# Patient Record
Sex: FEMALE | Race: WHITE | NOT HISPANIC OR LATINO | Employment: FULL TIME | URBAN - METROPOLITAN AREA
[De-identification: names, ages, dates, MRNs, and addresses within clinical notes are randomized per-mention and may not be internally consistent; named-entity substitution may affect disease eponyms.]

---

## 2023-02-21 LAB
ALANINE AMINOTRANSFERASE (SGPT) (U/L) IN SER/PLAS: 25 U/L (ref 7–45)
ALBUMIN (G/DL) IN SER/PLAS: 4.7 G/DL (ref 3.4–5)
ALKALINE PHOSPHATASE (U/L) IN SER/PLAS: 50 U/L (ref 33–136)
ANION GAP IN SER/PLAS: 13 MMOL/L (ref 10–20)
ASPARTATE AMINOTRANSFERASE (SGOT) (U/L) IN SER/PLAS: 23 U/L (ref 9–39)
BILIRUBIN TOTAL (MG/DL) IN SER/PLAS: 0.6 MG/DL (ref 0–1.2)
CALCIUM (MG/DL) IN SER/PLAS: 9.8 MG/DL (ref 8.6–10.6)
CARBON DIOXIDE, TOTAL (MMOL/L) IN SER/PLAS: 24 MMOL/L (ref 21–32)
CHLORIDE (MMOL/L) IN SER/PLAS: 110 MMOL/L (ref 98–107)
CREATININE (MG/DL) IN SER/PLAS: 0.89 MG/DL (ref 0.5–1.05)
GFR FEMALE: 73 ML/MIN/1.73M2
GLUCOSE (MG/DL) IN SER/PLAS: 104 MG/DL (ref 74–99)
POTASSIUM (MMOL/L) IN SER/PLAS: 4.1 MMOL/L (ref 3.5–5.3)
PROTEIN TOTAL: 6.6 G/DL (ref 6.4–8.2)
SODIUM (MMOL/L) IN SER/PLAS: 143 MMOL/L (ref 136–145)
UREA NITROGEN (MG/DL) IN SER/PLAS: 21 MG/DL (ref 6–23)

## 2023-06-06 ENCOUNTER — APPOINTMENT (OUTPATIENT)
Dept: PRIMARY CARE | Facility: CLINIC | Age: 63
End: 2023-06-06
Payer: COMMERCIAL

## 2023-09-06 LAB
ALANINE AMINOTRANSFERASE (SGPT) (U/L) IN SER/PLAS: 23 U/L (ref 7–45)
ALBUMIN (G/DL) IN SER/PLAS: 4.2 G/DL (ref 3.4–5)
ALKALINE PHOSPHATASE (U/L) IN SER/PLAS: 56 U/L (ref 33–136)
ANION GAP IN SER/PLAS: 15 MMOL/L (ref 10–20)
ASPARTATE AMINOTRANSFERASE (SGOT) (U/L) IN SER/PLAS: 19 U/L (ref 9–39)
BILIRUBIN TOTAL (MG/DL) IN SER/PLAS: 0.3 MG/DL (ref 0–1.2)
CALCIDIOL (25 OH VITAMIN D3) (NG/ML) IN SER/PLAS: 82 NG/ML
CALCIUM (MG/DL) IN SER/PLAS: 9.4 MG/DL (ref 8.6–10.6)
CARBON DIOXIDE, TOTAL (MMOL/L) IN SER/PLAS: 22 MMOL/L (ref 21–32)
CHLORIDE (MMOL/L) IN SER/PLAS: 113 MMOL/L (ref 98–107)
CREATININE (MG/DL) IN SER/PLAS: 0.93 MG/DL (ref 0.5–1.05)
GFR FEMALE: 69 ML/MIN/1.73M2
GLUCOSE (MG/DL) IN SER/PLAS: 97 MG/DL (ref 74–99)
PARATHYRIN INTACT (PG/ML) IN SER/PLAS: 50.2 PG/ML (ref 18.5–88)
POTASSIUM (MMOL/L) IN SER/PLAS: 4.3 MMOL/L (ref 3.5–5.3)
PROTEIN TOTAL: 6.3 G/DL (ref 6.4–8.2)
SODIUM (MMOL/L) IN SER/PLAS: 146 MMOL/L (ref 136–145)
UREA NITROGEN (MG/DL) IN SER/PLAS: 17 MG/DL (ref 6–23)

## 2023-09-09 LAB — BONE SPECIFIC ALKALINE PHOSPHATASE: 8.7 UG/L

## 2023-09-12 LAB — N-TELOPEPTIDE,SERUM: 7.5 NM BCE

## 2023-12-01 ENCOUNTER — HOSPITAL ENCOUNTER (OUTPATIENT)
Dept: RADIOLOGY | Facility: HOSPITAL | Age: 63
Discharge: HOME | End: 2023-12-01

## 2023-12-01 DIAGNOSIS — C50.912 MALIGNANT NEOPLASM OF UNSPECIFIED SITE OF LEFT FEMALE BREAST (MULTI): ICD-10-CM

## 2023-12-01 DIAGNOSIS — Z08 ENCOUNTER FOR FOLLOW-UP EXAMINATION AFTER COMPLETED TREATMENT FOR MALIGNANT NEOPLASM: ICD-10-CM

## 2023-12-01 PROCEDURE — A9575 INJ GADOTERATE MEGLUMI 0.1ML: HCPCS

## 2023-12-01 PROCEDURE — 6100000003 BI MR BREAST BILATERAL WITH CONTRAST FAST SCREENING SELF PAY: Mod: 50

## 2023-12-01 PROCEDURE — 2550000001 HC RX 255 CONTRASTS

## 2023-12-01 RX ORDER — GADOTERATE MEGLUMINE 376.9 MG/ML
15 INJECTION INTRAVENOUS
Status: COMPLETED | OUTPATIENT
Start: 2023-12-01 | End: 2023-12-01

## 2023-12-01 RX ADMIN — GADOTERATE MEGLUMINE 15 ML: 376.9 INJECTION INTRAVENOUS at 07:24

## 2023-12-04 ENCOUNTER — ANCILLARY PROCEDURE (OUTPATIENT)
Dept: RADIOLOGY | Facility: CLINIC | Age: 63
End: 2023-12-04
Payer: COMMERCIAL

## 2023-12-04 DIAGNOSIS — M81.0 AGE-RELATED OSTEOPOROSIS WITHOUT CURRENT PATHOLOGICAL FRACTURE: ICD-10-CM

## 2023-12-19 ENCOUNTER — ANCILLARY PROCEDURE (OUTPATIENT)
Dept: RADIOLOGY | Facility: CLINIC | Age: 63
End: 2023-12-19
Payer: COMMERCIAL

## 2023-12-19 PROCEDURE — 77080 DXA BONE DENSITY AXIAL: CPT | Performed by: RADIOLOGY

## 2023-12-19 PROCEDURE — 77080 DXA BONE DENSITY AXIAL: CPT

## 2024-02-27 PROBLEM — Z17.0 MALIGNANT NEOPLASM OF LEFT BREAST IN FEMALE, ESTROGEN RECEPTOR POSITIVE (MULTI): Status: ACTIVE | Noted: 2024-02-27

## 2024-02-27 PROBLEM — C50.912 MALIGNANT NEOPLASM OF LEFT BREAST IN FEMALE, ESTROGEN RECEPTOR POSITIVE (MULTI): Status: ACTIVE | Noted: 2024-02-27

## 2024-02-27 PROBLEM — Z85.3 ENCOUNTER FOR FOLLOW-UP SURVEILLANCE OF BREAST CANCER: Status: ACTIVE | Noted: 2024-02-27

## 2024-02-27 PROBLEM — Z08 ENCOUNTER FOR FOLLOW-UP SURVEILLANCE OF BREAST CANCER: Status: ACTIVE | Noted: 2024-02-27

## 2024-02-27 NOTE — PROGRESS NOTES
Patient ID: Lashay Isaac is a 63 y.o. female.  BREAST CANCER DIAGNOSIS:   Breast         AJCC Edition: 7th (AJCC), Diagnosis Date: 12-Apr-2017, IIA, T2 N0 M0         Treatment History:    1.  03/30/2017: Mammogram: 1. Suspicious spiculated mass in the superior left breast localizing  medially. 2. Suspicious left breast calcifications in the central superior  left breast at posterior depth.3. Left breast masslike asymmetry in the slightly medial superior  quadrant. 4. Right breast asymmetry.     2. 4/06/2017: Bilateral diagnostic mammogram: No mammographic evidence of  malignancy right breast. Suspicious left breast mass and suspicious adjacent left breast  calcifications. Unremarkable left axillary and left internal mammary lymph  nodes.      3. 04/11/2017: Dr. Mondragon in clinic Bilateral ultrasound was done. There  were no abnormal findings on the right. However, in the left breast, at 11:00, 11 cm  from the nipple, there was a 1.2 x 1.6 x 0.9 cm irregular spiculated densely  shadowing hypoechoic mass. Scanning of the left axilla demonstrated four unremarkable lymph nodes. Ultrasound guided core biopsy was done  on April 11, 2017 and this revealed invasive lobular carcinoma, grade 1-2 with associated lobular carcinoma  in situ. Estrogen receptors were positive at 90% and progesterone receptors  positive at 5-10%. HER-2/leonardo was negative.     4. 04/18/2017: Dr. Mondragon in clinic. The  tumor board recommended a trial of an aromatase inhibitor preoperatively to see  if we can shrink this tumor. Because of the location and size of the tumor, it would be helpful if it were smaller.  Plan on seeing the patient with additional breast imaging 2 months into  the trial of neoadjuvant hormonal  therapy.      5. 04/16/2017 Tumor Board: Oncotype testing.  Med Onc referral.   Rad Onc referral.  Consider preop Hormone therapy, Aromatase Inhibitor. Consider Partial Mastectomy or Mastectomy with SLNB.  Consider  combination  chemotherapy.    6.  04/25/2017: Patient underwent Stereotactic vacuum assisted core biopsy of left breast calcifications biopsy showed LCIS/atypical lobular  hyperplasia, in part involving a fibroadenoma with associated calcifications.      7. 05/10/2017 -  Oncotype DX RS on invasive core biopsy was 18 (low risk). LNs likely negative. [id number], Stage IIA.  Anastrozole as neoadjuvant  hormonal therapy initiated 5/11/17.     8. Definitive surgery with left skin and nipple sparing mastectomy, SLN 8/17/17 invasive lobular carcinoma with 0/4 SLN tumor measured 2.3 cm;  [id number]  Stage IIA G1.      9. Anastrazole changed to exemestane 25 mg daily 9/7/17. Twice yearly zolendronic acid  added for osteoporosis and prevention of recurrence 9/28/2017 given Q6 months. Completed zometa 4/2020.   10. Final reconstructive surgery left breast silcone implant reconstruction  and matching right reduction/mastopexy with Dr. Ware in 2/7/2018  11. 3/2023 Completion exemestane 5.5 years therapy, observational therapy moving forward      Past Medical History: Lashay has a past medical history of Encounter for gynecological examination (general) (routine) without abnormal findings (03/20/2017), Encounter for preprocedural laboratory examination (07/07/2017), Encounter for screening mammogram for malignant neoplasm of breast (03/20/2017), Other abnormal and inconclusive findings on diagnostic imaging of breast (07/24/2017), Other abnormal and inconclusive findings on diagnostic imaging of breast, Other abnormal and inconclusive findings on diagnostic imaging of breast (04/11/2017), and Personal history of other specified conditions (04/11/2017).  Surgical History:  Lashay has a past surgical history that includes Other surgical history (03/05/2018); Other surgical history (03/05/2018); Other surgical history (03/05/2018); Other surgical history (08/27/2017); and Tonsillectomy (04/11/2017).  Social Substance History:  ·  Smoking Status  former smoker   ·  Tobacco Use denies   ·  Alcohol Use denies   ·  Drug Use denies   ·  Additional History     Social Hx: Works FT as a [employer]. Happily  lives with  Mp with genetic heart issues.   Smoked for about 30-35 years total 1-2 PPD. Stopped 2017.    OBGYN history: Onset on menses: 12 Onset of menopause: at 46 years of age.  first child born when she was 29  She did not breast feed Used oral contraceptives for perhaps 18 years but never took fertility drugs or hormone replacement therapy.     Family History & Family Oncology History:  No family history of breast or ovarian cancer -Mother with colon cancer in her 70s -Maternal Grandfather with liver cancer  -Maternal Grandmother with melanoma     HISTORY OF PRESENT ILLNESS:  Lashay Isaac is a 63 y.o. female  is here today for breast  cancer treatment follow-up and surveillance. She completed exemestane in 2023 and has been observational therapy. She denies any breast cancer concerns today. She continues with stress from her aging mother's decline- she is in a facility- and has been enrolled on hospice.      She denies any chest pain or breathing issues, no cough, no sob but will have some exertional sob that resolves quickly with rest.      She denies any vision changes or headache issues, dizziness or loss of balance, no falls.      She denies any new or unexplained bone aches or pains. Continues baseline right hip and bilateral shoulder issues. She continues to intermittently will use ibuprofen- a few times per month.  Continues with rheumatology and is now on Prolia.      She denies any skin lesions or masses, oral sores lesions or infections with exception of left axilla intermittent tenderness that has been ongoing since her breast surgery. She does not feel any masses.      She reports a normal appetite and normal bowel movements, urination normal. She denies any issues with sexual functioning.      She  denies any issues with sleep or fatigue     She take 2,000 International Unit(s) daily vitamin D, additional she takes 600 calcium with vitamin D.        Review of Systems - Oncology  ROS 14 points performed, See HPI for exceptions    OBJECTIVE:    VS / Pain:  /86 (BP Location: Right arm, Patient Position: Sitting, BP Cuff Size: Adult)   Pulse 72   Temp 36.6 °C (97.9 °F) (Temporal)   Wt 78.8 kg (173 lb 11.6 oz)   SpO2 94%   BMI 30.77 kg/m²   BSA: 1.87 meters squared   Pain Scale: 0  ECO- Fully active, able to carry on all pre-disease performance w/o restriction.      Performance Status:       Physical Exam    Constitutional: Well developed, awake/alert/oriented x4, no distress, alert and cooperative  EYES: Sclera clear  ENMT: mucous membranes moist, no apparent injury, no lesions seen  Head/Neck: Neck supple, no apparent injury, thyroid without mass or tenderness, No JVD, trachea midline, no bruits  Respiratory / Thoracic: Patent airways, clear to all lobes, normal breath sounds with good chest expansion, thorax symmetric.  Cardiovascular: Regular, rate and rhythm, no murmurs, 2+ equal pulses of the extremities, normal auscultated S 1and S 2  GI: Nondistended, soft, non-tender, no rebound tenderness or guarding, no masses palpable, no organomegaly, +BS, no bruits  Musculoskeletal: ROM intact, no joint swelling, normal strength, no spinal tenderness  Extremities: normal extremities, no cyanosis edema, contusions or wounds, no clubbing  Neurological: alert and oriented x4, intact senses, motor, response and reflexes, normal strength  Breast: Left mastectomy and underlying silicone implant without abnormalities. Right breast mastopexy/reduction. No palpable masses or lesions in either breast. Baseline tenderness with deep palpation left axilla incision site.   Lymphatic: No cervical, supraclavicular, infraclavicular or axillary lymphadenopathy  Psychological: Appropriate and talkative mood and  behavior  Skin: Warm and dry, no lesions, no rashes, no jaundice      Diagnostic Results   XR DEXA bone density  Narrative: Interpreted By:  [place],   STUDY:  DEXA BONE [id number]/19/2023 11:56 am      INDICATION:  Signs/Symptoms: for follow up of osteoporosis  M81.0: Osteoporosis.  The patient is a 64 y/o  year old F.      COMPARISON:  Most recent prior is from 04/22/2021.      ACCESSION NUMBER(S):  [id number]      ORDERING CLINICIAN:  STELLA MURPHY      TECHNIQUE:  DEXA BONE DENSITY      FINDINGS:  SPINE L1-L4  Bone Mineral Density: 0.984  T-Score -1.6  Z-Score -0.2  Bone Mineral Density change vs baseline:  9.8%  Bone Mineral Density change vs previous: 12.1%      LEFT FEMUR -TOTAL  Bone Mineral Density: 0.766  T-Score -1.9   Z-Score  -0.8  Bone Mineral Density change vs baseline: 1.7%  Bone Mineral Density change vs previous: -1.2%      LEFT FEMUR -NECK  Bone Mineral Density: 0.653  T-Score -2.8  Z-Score -1.4          World Health Organization (WHO) criteria for post-menopausal,   Women:  Normal:         T-score at or above -1 SD  Osteopenia:   T-score between -1 and -2.5 SD  Osteoporosis: T-score at or below -2.5 SD          10-year Fracture Risk:  Major Osteoporotic Fracture  24.2  Hip Fracture                        3.3      Note:  If no FRAX score is reported, it is because:  Some T-score for Spine Total or Hip Total or Femoral Neck at or below  -2.5      This exam was performed at [facility] on a GE Lunar Prodigy Advance Dexa Unit.      Impression: DEXA:  According to World Health Organization criteria,  classification is osteoporosis.      Followup recommended in two years or sooner as clinically warranted.      All images and detailed analysis are available on the  Radiology  PACS.      MACRO:  None      Signed by: North Funk 1/4/2024 4:24 PM  Dictation workstation:   [id number]       MR breast bilateral w IV contrast fast screening self pay  12/01/2023    Narrative  Interpreted By:  Lul Suarez,  STUDY:  BI MR BREAST BILATERAL WITH CONTRAST FAST SCREENING SELF PAY;  12/1/2023 7:28 am    ACCESSION NUMBER(S):  [id number]    ORDERING CLINICIAN:  VIRIDIANA ALBRIGHT    INDICATION:  Dense breast tissue on mammography. History of left breast cancer  status post mastectomy in 2017. Right breast reduction.    COMPARISON:  Mammograms from 06/29/2023, 06/28/2022, MRs from 08/20/2021,  07/10/2019    TECHNIQUE:  Using a dedicated breast coil, STIR axial and T1-weighted fat  saturation axial images of the breasts were obtained, the latter both  before and after intravenous administration of Gadolinium DTPA.    Intravenous contrast:  15 mL of Dotarem    FINDINGS:  Density:  The breast tissue is heterogeneously dense, which may  obscure small masses.    Status post left mastectomy with silicone implant reconstruction.  Although this exam is not tailored for the evaluation of breast  implants, left breast implant is grossly intact.    There is minimal right breast background enhancement.    RIGHT BREAST:  No suspicious mass or nonmass enhancement is  identified.    No axillary or internal mammary lymphadenopathy is appreciated.    LEFT MASTECTOMY BED: No suspicious mass or nonmass enhancement is  identified.    Stable left internal mammary lymph node measuring up to 6 mm on  subtraction image 79 of 220. No axillary or internal mammary  lymphadenopathy is appreciated.    NON-BREAST FINDINGS:  None.    Impression  No MRI evidence of malignancy in either breast.  Although this exam  is not tailored for the evaluation of breast implants, left breast  implant is grossly intact.    BI-RADS CATEGORY:    BI-RADS Category:  2 Benign.  Recommendation:  Routine Screening Breast MRI in 1 Year.  Recommended Date:  1 Year.  Laterality:  Bilateral.    For any future breast imaging appointments, please call [phone] ([phone]).      MACRO:  None    Signed by: Lul Suarez 12/4/2023 4:13  PM    Narrative & Impression   Interpreted By:  RENETTA TOPETE MD and DAVE SILVA MD  MRN: [id number]  Patient Name: MAE FRIEDMAN     STUDY:  DIGITAL MAMM SCREENING W/ SPENCER;  6/29/2023 9:42 am     ACCESSION NUMBER(S):  [id number]     ORDERING CLINICIAN:  SHERRI SKAGGS     INDICATION:  Screening. History of left mastectomy in 2017. Right breast reduction.     COMPARISON:  06/28/2022, 06/25/2021, 06/22/2020     FINDINGS:  2D and tomosynthesis images were reviewed at 1 mm slice thickness.     The breast tissue is heterogeneously dense, which may obscure small  masses.  A reduction pattern is again seen. No suspicious masses or  calcifications are identified.     IMPRESSION:  No mammographic evidence of malignancy.     BI-RADS CATEGORY:  Category: 2 - Benign.  Recommendation: 1 Year Screening.     No visits with results within 6 Week(s) from this visit.   Latest known visit with results is:   Orders Only on 09/06/2023   Component Date Value Ref Range Status    Glucose 09/06/2023 97  74 - 99 mg/dL Final    Sodium 09/06/2023 146 (H)  136 - 145 mmol/L Final    Potassium 09/06/2023 4.3  3.5 - 5.3 mmol/L Final    Chloride 09/06/2023 113 (H)  98 - 107 mmol/L Final    Bicarbonate 09/06/2023 22  21 - 32 mmol/L Final    Anion Gap 09/06/2023 15  10 - 20 mmol/L Final    Urea Nitrogen 09/06/2023 17  6 - 23 mg/dL Final    Creatinine 09/06/2023 0.93  0.50 - 1.05 mg/dL Final    GFR Female 09/06/2023 69  >90 mL/min/1.73m2 Final    Calcium 09/06/2023 9.4  8.6 - 10.6 mg/dL Final    Albumin 09/06/2023 4.2  3.4 - 5.0 g/dL Final    Alkaline Phosphatase 09/06/2023 56  33 - 136 U/L Final    Total Protein 09/06/2023 6.3 (L)  6.4 - 8.2 g/dL Final    AST 09/06/2023 19  9 - 39 U/L Final    Total Bilirubin 09/06/2023 0.3  0.0 - 1.2 mg/dL Final    ALT (SGPT) 09/06/2023 23  7 - 45 U/L Final    PTH 09/06/2023 50.2  18.5 - 88.0 pg/mL Final    N-telopeptide,Serum 09/06/2023 7.5  nM BCE Final    Bone Specific Alkaline Phosphatase 09/06/2023 8.7   ug/L Final    Vitamin D, 25-Hydroxy 09/06/2023 82  ng/mL Final      Lab Results   Component Value Date    WBC 7.4 06/30/2021    HGB 14.4 06/30/2021    HCT 44.1 06/30/2021    MCV 95 06/30/2021     06/30/2021        Assessment/Plan   Malignant neoplasm of left breast in female, estrogen receptor positive (CMS/[facility]), Pathologic: pT2, pN0, cM0, ER+, HER2-  Diagnoses and all orders for this visit:  Malignant neoplasm of left breast in female, estrogen receptor positive, unspecified site of breast (CMS/[facility]) (Primary)  Encounter for follow-up surveillance of breast cancer    Problem List Items Addressed This Visit       Malignant neoplasm of left breast in female, estrogen receptor positive (CMS/[facility]) - Primary    Encounter for follow-up surveillance of breast cancer     Left Breast cancer [id number]  Stage IIA G1  - invasive ductal carcinoma , ER (90%), VT  (5-10%), Her 2 leonardo negative (1+). Oncotype dx score 18. No recommendation for chemotherapy. Preop hormonal therapy per tumor board initiated Anastrozole 5/11/17 for a duration of about 3-4 months. She had left  skin and nipple sparing mastectomy with little significant tumor reduction and 2.3 cm residual mass. Tumor board recommended change in hormonal treatment. Changed from anastrazole to exemestane 9/7/17 with good tolerance. Was completed at 5.5+ years March 2023 given low risk utilizing CTS-5 at 4.1%.   - Final reconstructive surgery left breast silicone implant reconstruction  and matching right reduction/mastopexy with Dr. Ware in 2/7/2018     There is no evidence of breast cancer recurrence based on her clinical exam today. She will continue to follow with me annually. Orders placed today for mammogram AFTER 6/29/24 and next annual follow up to rotate 6 months from annual mammogram      Bone Health  - H/O ibandronate for osteoporosis. Though with diagnosis of breast CA, zolendronic acid twice yearly initiated for osteoporosis and prevention of  recurrence instead of the ibandronate. Adjuvant Zometa x6 dose 9/2017 through 4/2020  Repeat DEXA with osteoporosis T-score -3.1 4/2021, repeat 12/2023 T-score -2.8. Has established with , rheumatology. Has been on Prolia since 9/2022. Rheum will continue to manage DEXA moving forward     General Health Maintenance.   Was Following with , has retired. Again today has been instructed to establish w/ a new  PCP  Is well established with GYN     At least 25 minutes of direct consultation was spent with the patient today reviewing her cancer care plan, educating and answering questions regarding ongoing follow up, greater than 50% in counseling  and coordination of care.      Treatment Plan:  [No matching plan found]       Thank you for the opportunity to be involved in the care of Lashay Isaac.   We discussed the clinical significance of diagnosis, goals of care and treatment plan in detail.  Please do not hesitate to reach out with any questions. Thank you.     -------------------------------------------------------------------------------------------------------------------------------  Marysol Douglas MSN, APRN, FNP-C  [facility]  Division of Medical Oncology- Breast   Collaborating Physician Dr. Jeffrey Geller   [facility] Melinda Granado, Jacque [place] [address]  Phone: [phone]  Fax: [phone]  Available via Secure Chat    Confidential Peer Review Document-  Privilege  Privileged Pursuant to [place] Revised Code Section 2305.24, .25, .251 & .252

## 2024-02-28 ENCOUNTER — OFFICE VISIT (OUTPATIENT)
Dept: HEMATOLOGY/ONCOLOGY | Facility: CLINIC | Age: 64
End: 2024-02-28
Payer: COMMERCIAL

## 2024-02-28 VITALS
HEART RATE: 72 BPM | OXYGEN SATURATION: 94 % | WEIGHT: 173.72 LBS | TEMPERATURE: 97.9 F | DIASTOLIC BLOOD PRESSURE: 86 MMHG | SYSTOLIC BLOOD PRESSURE: 134 MMHG | BODY MASS INDEX: 30.77 KG/M2

## 2024-02-28 DIAGNOSIS — C50.912 MALIGNANT NEOPLASM OF LEFT BREAST IN FEMALE, ESTROGEN RECEPTOR POSITIVE, UNSPECIFIED SITE OF BREAST (MULTI): Primary | ICD-10-CM

## 2024-02-28 DIAGNOSIS — Z08 ENCOUNTER FOR FOLLOW-UP SURVEILLANCE OF BREAST CANCER: ICD-10-CM

## 2024-02-28 DIAGNOSIS — Z85.3 ENCOUNTER FOR FOLLOW-UP SURVEILLANCE OF BREAST CANCER: ICD-10-CM

## 2024-02-28 DIAGNOSIS — Z17.0 MALIGNANT NEOPLASM OF LEFT BREAST IN FEMALE, ESTROGEN RECEPTOR POSITIVE, UNSPECIFIED SITE OF BREAST (MULTI): Primary | ICD-10-CM

## 2024-02-28 PROCEDURE — 99215 OFFICE O/P EST HI 40 MIN: CPT | Performed by: NURSE PRACTITIONER

## 2024-02-28 RX ORDER — NITROFURANTOIN 25; 75 MG/1; MG/1
CAPSULE ORAL EVERY 12 HOURS
COMMUNITY
Start: 2022-06-20 | End: 2024-03-27 | Stop reason: WASHOUT

## 2024-02-28 RX ORDER — ZOLEDRONIC ACID 4 MG/100ML
SOLUTION INTRAVENOUS
COMMUNITY
End: 2024-03-27 | Stop reason: WASHOUT

## 2024-02-28 RX ORDER — CIPROFLOXACIN 500 MG/1
TABLET ORAL
COMMUNITY
Start: 2015-05-03 | End: 2024-03-27 | Stop reason: WASHOUT

## 2024-02-28 RX ORDER — CYANOCOBALAMIN (VITAMIN B-12) 1000 MCG
TABLET, EXTENDED RELEASE ORAL
COMMUNITY

## 2024-02-28 RX ORDER — CHOLECALCIFEROL (VITAMIN D3) 50 MCG
2000 TABLET ORAL
COMMUNITY
End: 2024-02-28 | Stop reason: WASHOUT

## 2024-02-28 RX ORDER — PHENAZOPYRIDINE HYDROCHLORIDE 200 MG/1
TABLET, FILM COATED ORAL
COMMUNITY
Start: 2016-05-03 | End: 2024-03-27 | Stop reason: WASHOUT

## 2024-02-28 RX ORDER — EXEMESTANE 25 MG/1
25 TABLET ORAL DAILY
COMMUNITY
End: 2024-03-27 | Stop reason: WASHOUT

## 2024-02-28 RX ORDER — CAFFEINE 200 MG
TABLET ORAL
COMMUNITY

## 2024-02-28 RX ORDER — CIPROFLOXACIN 250 MG/1
TABLET, FILM COATED ORAL
COMMUNITY
Start: 2016-05-03 | End: 2024-03-27 | Stop reason: WASHOUT

## 2024-02-28 RX ORDER — CALCIUM CARBONATE 600 MG
1500 TABLET ORAL DAILY
COMMUNITY

## 2024-02-28 RX ORDER — ZINC/VIT C/PYRIDOXINE (VIT B6) 12-60-0.5
LOZENGE ORAL DAILY
COMMUNITY
End: 2024-03-27 | Stop reason: WASHOUT

## 2024-02-28 RX ORDER — CHOLECALCIFEROL (VITAMIN D3) 25 MCG
TABLET ORAL DAILY
COMMUNITY

## 2024-02-28 ASSESSMENT — PAIN SCALES - GENERAL: PAINLEVEL: 0-NO PAIN

## 2024-02-28 NOTE — PATIENT INSTRUCTIONS
Marysol Douglas APRDWAYNE, CNP follow-up annually Jan 2025. Plan for annual Mammogram AFTER 6/29/25 -I will call once resulted      Gyn Dr. Mariaelena Edwards annually and as needed     DEXA repeat with continue osteoporosis 12/2023      Colonoscopy completed 9/2021     PCP -please reestablish with a new PCP annually and as needed, routine lab work.      Dermatology annual skin check     Call with any questions, concerns or treatment intolerance prior to next office visit [phone].

## 2024-03-06 ENCOUNTER — APPOINTMENT (OUTPATIENT)
Dept: HEMATOLOGY/ONCOLOGY | Facility: CLINIC | Age: 64
End: 2024-03-06
Payer: COMMERCIAL

## 2024-03-13 ENCOUNTER — LAB (OUTPATIENT)
Dept: LAB | Facility: LAB | Age: 64
End: 2024-03-13
Payer: COMMERCIAL

## 2024-03-13 DIAGNOSIS — E55.9 VITAMIN D DEFICIENCY, UNSPECIFIED: Primary | ICD-10-CM

## 2024-03-13 LAB
25(OH)D3 SERPL-MCNC: 47 NG/ML (ref 30–100)
ALBUMIN SERPL BCP-MCNC: 4.2 G/DL (ref 3.4–5)
ALP SERPL-CCNC: 48 U/L (ref 33–136)
ALT SERPL W P-5'-P-CCNC: 22 U/L (ref 7–45)
ANION GAP SERPL CALC-SCNC: 14 MMOL/L (ref 10–20)
AST SERPL W P-5'-P-CCNC: 20 U/L (ref 9–39)
BILIRUB SERPL-MCNC: 0.5 MG/DL (ref 0–1.2)
BUN SERPL-MCNC: 19 MG/DL (ref 6–23)
CALCIUM SERPL-MCNC: 9.4 MG/DL (ref 8.6–10.6)
CHLORIDE SERPL-SCNC: 111 MMOL/L (ref 98–107)
CO2 SERPL-SCNC: 25 MMOL/L (ref 21–32)
CREAT SERPL-MCNC: 0.93 MG/DL (ref 0.5–1.05)
EGFRCR SERPLBLD CKD-EPI 2021: 69 ML/MIN/1.73M*2
GLUCOSE SERPL-MCNC: 96 MG/DL (ref 74–99)
POTASSIUM SERPL-SCNC: 4.2 MMOL/L (ref 3.5–5.3)
PROT SERPL-MCNC: 6.3 G/DL (ref 6.4–8.2)
SODIUM SERPL-SCNC: 146 MMOL/L (ref 136–145)

## 2024-03-13 PROCEDURE — 80053 COMPREHEN METABOLIC PANEL: CPT

## 2024-03-13 PROCEDURE — 36415 COLL VENOUS BLD VENIPUNCTURE: CPT

## 2024-03-13 PROCEDURE — 82306 VITAMIN D 25 HYDROXY: CPT

## 2024-03-13 PROCEDURE — 82523 COLLAGEN CROSSLINKS: CPT

## 2024-03-16 LAB — COLLAGEN CTX SERPL-MCNC: 79 PG/ML

## 2024-03-22 DIAGNOSIS — M81.0 OSTEOPOROSIS, UNSPECIFIED OSTEOPOROSIS TYPE, UNSPECIFIED PATHOLOGICAL FRACTURE PRESENCE: Primary | ICD-10-CM

## 2024-03-27 ENCOUNTER — OFFICE VISIT (OUTPATIENT)
Dept: RHEUMATOLOGY | Facility: CLINIC | Age: 64
End: 2024-03-27
Payer: COMMERCIAL

## 2024-03-27 VITALS
DIASTOLIC BLOOD PRESSURE: 84 MMHG | TEMPERATURE: 97.6 F | HEART RATE: 77 BPM | HEIGHT: 63 IN | WEIGHT: 175 LBS | BODY MASS INDEX: 31.01 KG/M2 | SYSTOLIC BLOOD PRESSURE: 129 MMHG

## 2024-03-27 DIAGNOSIS — M81.0 AGE RELATED OSTEOPOROSIS, UNSPECIFIED PATHOLOGICAL FRACTURE PRESENCE: Primary | ICD-10-CM

## 2024-03-27 DIAGNOSIS — Z51.81 ENCOUNTER FOR MONITORING DENOSUMAB THERAPY: ICD-10-CM

## 2024-03-27 DIAGNOSIS — Z79.899 ENCOUNTER FOR MONITORING DENOSUMAB THERAPY: ICD-10-CM

## 2024-03-27 PROCEDURE — 99214 OFFICE O/P EST MOD 30 MIN: CPT | Performed by: INTERNAL MEDICINE

## 2024-03-27 PROCEDURE — 96372 THER/PROPH/DIAG INJ SC/IM: CPT | Performed by: INTERNAL MEDICINE

## 2024-03-27 PROCEDURE — 1036F TOBACCO NON-USER: CPT | Performed by: INTERNAL MEDICINE

## 2024-03-27 PROCEDURE — 2500000004 HC RX 250 GENERAL PHARMACY W/ HCPCS (ALT 636 FOR OP/ED): Mod: JZ | Performed by: INTERNAL MEDICINE

## 2024-03-27 RX ADMIN — DENOSUMAB 60 MG: 60 INJECTION SUBCUTANEOUS at 09:36

## 2024-03-27 ASSESSMENT — PAIN SCALES - GENERAL: PAINLEVEL: 0-NO PAIN

## 2024-03-27 NOTE — PROGRESS NOTES
Prolia 60mg/ml administered to patient's right arm without incident. Patient tolerated well. Received medicine from [facility] pharmacy. Patient questions answered and next appointment scheduled prior to leaving office. Encouraged to call office with any concerns should they arise.

## 2024-03-27 NOTE — PROGRESS NOTES
Recall  63 y/o female coming in today for f/u of osteoporosis based on dexa scan done on 04/2021 with the lowest t score of -3.1 of the Right femur neck. She also has hx of invasive lobular carcinoma, grade 1-2 with associated lobular carcinoma in situ, ER, NV positive. HER-2/leonardo was negative. She was initially on anastrozole and then was switched to exemestane 25 mg daily 9/7/17. She was started on a monthly pill per patient (boniva?) for osteoporosis which was changed in 4 months to twice yearly zolendronic acid 4mg for osteoporosis and prevention of bone mets. she received this every 6 months. She completed zometa in April 2020, had a repeat dexa in 4 2021 which showed worsening bone density from her 2020 dexa. Her lowest t score in -3.1 in right femoral neck, -2.7 in left femoral neck and -2.4 in her L1-l4 spine. She has been referred to us by her hem-onc doctor to further assist in osteoporosis management as she still has 5 yrs of aromatase inhibitor therapy left.         Current meds  Prolia 60mg Q6M - 09/07/2022, 3/8/2023, 9/2023     History of Present Illness: At today's visit, the patient feels fine. No fever or chills or jaw pain.  Has no rash or bone pain. Has occasional neck and joint pain.           Review of Systems  Constitutional: Denies fatigue  Head: Denies headaches or hair loss  Eyes: Denies dry eyes, blurry vision, redness of the eyes, pain in the eyes or H/O uveitis.  ENT: Has dry mouth, but no loss of taste, sores in the mouth  Cardiovascular: Denies chest pain, palpitations  Respiratory: Denies shortness of breath or cough or wheezing  Gastrointestinal: Denies nausea, vomiting, heartburn, abdominal pain , diarrhea or blood in the stool.   Integumentary: Denies photosensitivity, rash or lesions, Raynaud's or psoriasis  Neurological: Denies any numbness or tingling or weakness  Hematologic/Lymphatic: Denies bleeding, bruising, Raynaud's phenomenon  MSK: As per HPI.            Active  Problems  Problems    · Abdominal pain (789.00) (R10.9)   · Abnormal urine finding (791.9) (R82.90)   · Breast cancer, left (174.9) (C50.912)   · Dehydration (276.51) (E86.0)   · Dense breast tissue on mammogram (793.89) (R92.2)   · Dyslipidemia (272.4) (E78.5)   · Encounter for annual routine gynecological examination (V72.31) (Z01.419)   · Encounter for follow-up surveillance of breast cancer (V67.9,V10.3) (Z08,Z85.3)   · Encounter for gynecological examination (V72.31) (Z01.419)   · Encounter for long-term (current) use of high-risk medication (V58.69) (Z79.899)   · Encounter for screening colonoscopy (V76.51) (Z12.11)   · Hematuria (599.70) (R31.9)   · History of sepsis (V12.09) (Z86.19)   · Melanoma (172.9) (C43.9)   · Osteopenia (733.90) (M85.80)   · Osteoporosis (733.00) (M81.0)   · Use of exemestane (Aromasin) (V07.52) (Z79.811)   · Viral syndrome (079.99) (B34.9)   · Visit for screening mammogram (V76.12) (Z12.31)   · Vitamin D deficiency (268.9) (E55.9)   · Weight gain, abnormal (783.1) (R63.5)     Past Medical History  Problems    · History of Abnormal MRI, breast (793.89) (R92.8)   · Resolved Date: 19 Jun 2018   · History of Abnormal screening mammogram (793.80) (R92.8)   · Resolved Date: 19 Jun 2018   · History of Abnormal ultrasound of breast (793.89) (R92.8)   · Resolved Date: 19 Jun 2018   · History of Encounter for gynecological examination (V72.31) (Z01.419)   · Resolved Date: 11 Apr 2017   · History of lump of left breast (V13.89) (Z87.898)   · Resolved Date: 19 Jun 2018   · History of Pre-procedure lab exam (V72.63) (Z01.812)   · Resolved Date: 19 Jun 2018   · History of Screening mammogram, encounter for (V76.12) (Z12.31)   · Resolved Date: 11 Apr 2017     Surgical History  Problems    · History of Breast Surgery Mastopexy Right Breast   · History of Breast Surgery Reconstruction With Tissue Expander   · History of Breast Surgery Reduction Procedure Right Breast   · History of Mastectomy Left  "Breast   · History of Tonsillectomy     Family History  Mother    · Family history of colon cancer (V16.0) (Z80.0)  Father    · No pertinent family history  Maternal Grandmother    · Family history of Melanoma  Maternal Uncle    · Family history of Colon cancer     Social History  Problems    · Former smoker (V15.82) (Z87.891)   · Non-smoker (V49.89) (Z78.9)   · Occasional alcohol use     Allergies  Medication    · No Known Drug Allergies  Recorded By: Curtis Mckeon; 3/20/2017 3:12:08 PM     Current Meds  Current Outpatient Medications   Medication Sig Dispense Refill    B complex-vitamin C-folic acid (Dialyvite) 100-1 mg tablet Take by mouth.      caffeine 200 mg Take by mouth.      calcium carbonate 600 mg calcium (1,500 mg) tablet Take 1 tablet (1,500 mg) by mouth once daily.      cholecalciferol (Vitamin D-3) 25 MCG (1000 UT) tablet Take by mouth once daily.      L. acidophilus/Bifid. animalis (Daily Probiotic) 2.5 billion cell capsule Take by mouth.       Current Facility-Administered Medications   Medication Dose Route Frequency Provider Last Rate Last Admin    denosumab (Prolia) injection 60 mg  60 mg subcutaneous q6 months Tatiana Sagastume MD                   Physical Exam  Blood pressure 129/84, pulse 77, temperature 36.4 °C (97.6 °F), height 1.6 m (5' 3\"), weight 79.4 kg (175 lb).    General: AAOx3. Cooperative. EOMI, conjunctiva clear, sclera white, anicteric   Lungs: chest expansion symmetric Clear to auscultation bilaterally. No wheezing, rhonchi, stridor.   Heart: S1, S2, RRR. No murmur, rub.   Neuro: no focal deficit   Skin: No rashes, ulcers or photosensitive areas   MSK: Upper Extremities:   Hand/Fingers: No redness, swelling, pain at DIP, PIP, or MCP joints, FROM grossly.   Wrists: no erythema, swelling, or pain at wrist, FROM grossly   Elbows: No swelling, pain, erythema on elbows, FROM grossly   Shoulders: No swelling, redness, tenderness at shoulders , normal ROM b/l  Knees: No pain, " deformities, swelling, rashes, warmth, normal ROM grossly   Ankles: no ulceration, warmth, swelling, synovitis at ankle joints, normal ROM grossly.  Spine: No TTP of the spine or paraspinal muscles, tight trapezius muscles bilaterally but non tender to palpation     12/23    TECHNIQUE:  DEXA BONE DENSITY      FINDINGS:  SPINE L1-L4  Bone Mineral Density: 0.984  T-Score -1.6  Z-Score -0.2  Bone Mineral Density change vs baseline:  9.8%  Bone Mineral Density change vs previous: 12.1%      LEFT FEMUR -TOTAL  Bone Mineral Density: 0.766  T-Score -1.9   Z-Score  -0.8  Bone Mineral Density change vs baseline: 1.7%  Bone Mineral Density change vs previous: -1.2%      LEFT FEMUR -NECK  Bone Mineral Density: 0.653  T-Score -2.8  Z-Score -1.4          World Health Organization (WHO) criteria for post-menopausal,   Women:  Normal:         T-score at or above -1 SD  Osteopenia:   T-score between -1 and -2.5 SD  Osteoporosis: T-score at or below -2.5 SD          10-year Fracture Risk:  Major Osteoporotic Fracture  24.2  Hip Fracture                        3.3     Component      Latest Ref Rng 3/13/2024   GLUCOSE      74 - 99 mg/dL 96    SODIUM      136 - 145 mmol/L 146 (H)    POTASSIUM      3.5 - 5.3 mmol/L 4.2    CHLORIDE      98 - 107 mmol/L 111 (H)    Bicarbonate      21 - 32 mmol/L 25    Anion Gap      10 - 20 mmol/L 14    Blood Urea Nitrogen      6 - 23 mg/dL 19    Creatinine      0.50 - 1.05 mg/dL 0.93    EGFR      >60 mL/min/1.73m*2 69    Calcium      8.6 - 10.6 mg/dL 9.4    Albumin      3.4 - 5.0 g/dL 4.2    Alkaline Phosphatase      33 - 136 U/L 48    Total Protein      6.4 - 8.2 g/dL 6.3 (L)    AST      9 - 39 U/L 20    Bilirubin Total      0.0 - 1.2 mg/dL 0.5    ALT      7 - 45 U/L 22    C-Telopeptide, Beta Cross Linked      pg/mL 79    Vitamin D, 25-Hydroxy, Total      30 - 100 ng/mL 47           Assessment/plan: 63 yr old WF with OP. BMD reviewed. Has improved in the lumbar spine spine but stable in hips.  Will continue Prolia.   Labs reviewed.     Reviewed and approved by MAGALY HOFFMAN on 3/27/24 at 9:31 AM.

## 2024-05-31 ENCOUNTER — OFFICE VISIT (OUTPATIENT)
Dept: PRIMARY CARE | Facility: CLINIC | Age: 64
End: 2024-05-31
Payer: COMMERCIAL

## 2024-05-31 VITALS
HEART RATE: 71 BPM | DIASTOLIC BLOOD PRESSURE: 80 MMHG | OXYGEN SATURATION: 97 % | WEIGHT: 188 LBS | SYSTOLIC BLOOD PRESSURE: 122 MMHG | HEIGHT: 63 IN | BODY MASS INDEX: 33.31 KG/M2

## 2024-05-31 DIAGNOSIS — M81.0 AGE-RELATED OSTEOPOROSIS WITHOUT CURRENT PATHOLOGICAL FRACTURE: ICD-10-CM

## 2024-05-31 DIAGNOSIS — Z87.891 FORMER SMOKER: ICD-10-CM

## 2024-05-31 DIAGNOSIS — Z00.00 HEALTH MAINTENANCE EXAMINATION: Primary | ICD-10-CM

## 2024-05-31 PROCEDURE — 99396 PREV VISIT EST AGE 40-64: CPT | Performed by: FAMILY MEDICINE

## 2024-05-31 PROCEDURE — 1036F TOBACCO NON-USER: CPT | Performed by: FAMILY MEDICINE

## 2024-05-31 ASSESSMENT — PATIENT HEALTH QUESTIONNAIRE - PHQ9
SUM OF ALL RESPONSES TO PHQ9 QUESTIONS 1 AND 2: 0
1. LITTLE INTEREST OR PLEASURE IN DOING THINGS: NOT AT ALL
2. FEELING DOWN, DEPRESSED OR HOPELESS: NOT AT ALL

## 2024-05-31 ASSESSMENT — ENCOUNTER SYMPTOMS
OCCASIONAL FEELINGS OF UNSTEADINESS: 0
DEPRESSION: 0
LOSS OF SENSATION IN FEET: 0

## 2024-05-31 NOTE — ASSESSMENT & PLAN NOTE
Recommended screening guidelines addressed and orders placed as indicated by age and chronic conditions  Screening labs ordered, will call with results  Colonoscopy done 2021, repeat in 10 years  Mammogram, breast and PAP through GYN/breast clinic  Continue to work on healthy lifestyle including well balanced diet, regular activity, limit alcohol, no tobacco products and safe sexual practices  Follow up annually

## 2024-05-31 NOTE — PROGRESS NOTES
Reason for Visit: Annual Physical Exam    HPI:  Presents to establish care.  Past medical history is significant for breast CA diagnosed in 2017.  She continues follow up with medical oncology, sees rheumatology for Prolia secondary to osteoporosis.  Has regular GYN care as well.    Overall no concerns    Patient was a former smoker, smoked from 18 until 54.  She has no symptoms or issues.  Has never had a lung scan.    Active Problem List  Patient Active Problem List   Diagnosis    Malignant neoplasm of left breast in female, estrogen receptor positive (Multi)    Encounter for follow-up surveillance of breast cancer    Health maintenance examination    Former smoker       Comprehensive Medical/Surgical/Social/Family History  Past Medical History:   Diagnosis Date    Encounter for gynecological examination (general) (routine) without abnormal findings 03/20/2017    Encounter for gynecological examination    Encounter for preprocedural laboratory examination 07/07/2017    Pre-procedure lab exam    Encounter for screening mammogram for malignant neoplasm of breast 03/20/2017    Screening mammogram, encounter for    Other abnormal and inconclusive findings on diagnostic imaging of breast 07/24/2017    Abnormal MRI, breast    Other abnormal and inconclusive findings on diagnostic imaging of breast     Abnormal screening mammogram    Other abnormal and inconclusive findings on diagnostic imaging of breast 04/11/2017    Abnormal ultrasound of breast    Personal history of other specified conditions 04/11/2017    History of lump of left breast     Past Surgical History:   Procedure Laterality Date    OTHER SURGICAL HISTORY  03/05/2018    Mastectomy Left Breast    OTHER SURGICAL HISTORY  03/05/2018    Breast Surgery Reduction Procedure Right Breast    OTHER SURGICAL HISTORY  03/05/2018    Breast Surgery Mastopexy Right Breast    OTHER SURGICAL HISTORY  08/27/2017    Breast Surgery Reconstruction With Tissue Expander     "TONSILLECTOMY  04/11/2017    Tonsillectomy     Social History     Tobacco Use    Smoking status: Former     Types: Cigarettes    Smokeless tobacco: Never   Substance Use Topics    Alcohol use: Yes     Alcohol/week: 1.0 standard drink of alcohol     Types: 1 Glasses of wine per week    Drug use: Never     No family history on file.      Allergies and Medications  Patient has no known allergies.  Current Outpatient Medications on File Prior to Visit   Medication Sig Dispense Refill    B complex-vitamin C-folic acid (Dialyvite) 100-1 mg tablet Take by mouth.      caffeine 200 mg Take by mouth.      calcium carbonate 600 mg calcium (1,500 mg) tablet Take 1 tablet (1,500 mg) by mouth once daily.      cholecalciferol (Vitamin D-3) 25 MCG (1000 UT) tablet Take by mouth once daily.      L. acidophilus/Bifid. animalis (Daily Probiotic) 2.5 billion cell capsule Take by mouth.       Current Facility-Administered Medications on File Prior to Visit   Medication Dose Route Frequency Provider Last Rate Last Admin    [START ON 10/2/2024] denosumab (Prolia) injection 60 mg  60 mg subcutaneous q6 months Tatiana Sagastume MD             ROS otherwise negative aside from what was mentioned above in HPI.    Vitals  /80 (BP Location: Right arm, Patient Position: Sitting, BP Cuff Size: Adult)   Pulse 71   Ht 1.6 m (5' 3\")   Wt 85.3 kg (188 lb)   LMP  (LMP Unknown)   SpO2 97%   BMI 33.30 kg/m²   Body mass index is 33.3 kg/m².  Physical Exam  Gen: Alert, NAD  HEENT:  PERRL, EOMI, conjunctiva and sclera normal in appearance. External auditory canals/TMs normal; Oral cavity and posterior pharynx without lesions/exudate  Neck:  Supple with FROM; No masses/nodes palpable; Thyroid nontender and without nodules; No LENORE  Respiratory:  Lungs CTAB  Cardiovascular:  Heart RRR. No M/R/G. Peripheral pulses equal bilaterally  Abdomen:  Soft, nontender, No R/G/R; No HSM or masses palpated  Extremities:  FROM all extremities; Muscle strength " grossly normal with good tone  Neuro:  CN II-XII intact Gross motor and sensory intact  Skin:  No suspicious lesions present    Assessment and Plan:  Problem List Items Addressed This Visit       Health maintenance examination - Primary    Current Assessment & Plan     Recommended screening guidelines addressed and orders placed as indicated by age and chronic conditions  Screening labs ordered, will call with results  Colonoscopy done 2021, repeat in 10 years  Mammogram, breast and PAP through GYN/breast clinic  Continue to work on healthy lifestyle including well balanced diet, regular activity, limit alcohol, no tobacco products and safe sexual practices  Follow up annually           Relevant Orders    Lipid Panel    Basic Metabolic Panel    CBC    TSH with reflex to Free T4 if abnormal    Former smoker    Current Assessment & Plan     Meets criteria for low dose lung screening CT, order placed         Relevant Orders    CT lung screening low dose     Other Visit Diagnoses       Age-related osteoporosis without current pathological fracture        Relevant Orders    TSH with reflex to Free T4 if abnormal              Devora Cochran MD

## 2024-06-25 ENCOUNTER — HOSPITAL ENCOUNTER (OUTPATIENT)
Dept: RADIOLOGY | Facility: HOSPITAL | Age: 64
Discharge: HOME | End: 2024-06-25
Payer: COMMERCIAL

## 2024-06-25 DIAGNOSIS — Z87.891 FORMER SMOKER: ICD-10-CM

## 2024-06-25 PROCEDURE — 71271 CT THORAX LUNG CANCER SCR C-: CPT

## 2024-06-28 ENCOUNTER — LAB (OUTPATIENT)
Dept: LAB | Facility: LAB | Age: 64
End: 2024-06-28
Payer: COMMERCIAL

## 2024-06-28 ENCOUNTER — APPOINTMENT (OUTPATIENT)
Dept: OBSTETRICS AND GYNECOLOGY | Facility: CLINIC | Age: 64
End: 2024-06-28
Payer: COMMERCIAL

## 2024-06-28 VITALS — SYSTOLIC BLOOD PRESSURE: 113 MMHG | WEIGHT: 167 LBS | DIASTOLIC BLOOD PRESSURE: 69 MMHG | BODY MASS INDEX: 29.58 KG/M2

## 2024-06-28 DIAGNOSIS — Z00.00 HEALTH MAINTENANCE EXAMINATION: ICD-10-CM

## 2024-06-28 DIAGNOSIS — M81.0 AGE-RELATED OSTEOPOROSIS WITHOUT CURRENT PATHOLOGICAL FRACTURE: ICD-10-CM

## 2024-06-28 DIAGNOSIS — Z01.411 ENCOUNTER FOR GYNECOLOGICAL EXAMINATION WITH ABNORMAL FINDING: Primary | ICD-10-CM

## 2024-06-28 LAB
ANION GAP SERPL CALC-SCNC: 13 MMOL/L (ref 10–20)
BUN SERPL-MCNC: 15 MG/DL (ref 6–23)
CALCIUM SERPL-MCNC: 9.7 MG/DL (ref 8.6–10.6)
CHLORIDE SERPL-SCNC: 108 MMOL/L (ref 98–107)
CHOLEST SERPL-MCNC: 247 MG/DL (ref 0–199)
CHOLESTEROL/HDL RATIO: 5
CO2 SERPL-SCNC: 25 MMOL/L (ref 21–32)
CREAT SERPL-MCNC: 0.96 MG/DL (ref 0.5–1.05)
EGFRCR SERPLBLD CKD-EPI 2021: 67 ML/MIN/1.73M*2
ERYTHROCYTE [DISTWIDTH] IN BLOOD BY AUTOMATED COUNT: 13.2 % (ref 11.5–14.5)
GLUCOSE SERPL-MCNC: 94 MG/DL (ref 74–99)
HCT VFR BLD AUTO: 40.8 % (ref 36–46)
HDLC SERPL-MCNC: 49.5 MG/DL
HGB BLD-MCNC: 14.1 G/DL (ref 12–16)
LDLC SERPL CALC-MCNC: 165 MG/DL
MCH RBC QN AUTO: 31.4 PG (ref 26–34)
MCHC RBC AUTO-ENTMCNC: 34.6 G/DL (ref 32–36)
MCV RBC AUTO: 91 FL (ref 80–100)
NON HDL CHOLESTEROL: 198 MG/DL (ref 0–149)
NRBC BLD-RTO: 0 /100 WBCS (ref 0–0)
PLATELET # BLD AUTO: 216 X10*3/UL (ref 150–450)
POTASSIUM SERPL-SCNC: 4.2 MMOL/L (ref 3.5–5.3)
RBC # BLD AUTO: 4.49 X10*6/UL (ref 4–5.2)
SODIUM SERPL-SCNC: 142 MMOL/L (ref 136–145)
T4 FREE SERPL-MCNC: 1.03 NG/DL (ref 0.78–1.48)
TRIGL SERPL-MCNC: 164 MG/DL (ref 0–149)
TSH SERPL-ACNC: 4.7 MIU/L (ref 0.44–3.98)
VLDL: 33 MG/DL (ref 0–40)
WBC # BLD AUTO: 8.5 X10*3/UL (ref 4.4–11.3)

## 2024-06-28 PROCEDURE — 85027 COMPLETE CBC AUTOMATED: CPT

## 2024-06-28 PROCEDURE — 80048 BASIC METABOLIC PNL TOTAL CA: CPT

## 2024-06-28 PROCEDURE — 36415 COLL VENOUS BLD VENIPUNCTURE: CPT

## 2024-06-28 PROCEDURE — 99396 PREV VISIT EST AGE 40-64: CPT | Performed by: OBSTETRICS & GYNECOLOGY

## 2024-06-28 PROCEDURE — 1036F TOBACCO NON-USER: CPT | Performed by: OBSTETRICS & GYNECOLOGY

## 2024-06-28 PROCEDURE — 84439 ASSAY OF FREE THYROXINE: CPT

## 2024-06-28 PROCEDURE — 80061 LIPID PANEL: CPT

## 2024-06-28 PROCEDURE — 84443 ASSAY THYROID STIM HORMONE: CPT

## 2024-06-28 NOTE — PROGRESS NOTES
Subjective   Lashay FRAZIER Lader is a 63 y.o. female here for a routine exam.  She has no postmenopausal bleeding or pelvic pain.  No dysuria, no change in bowel habits or vaginal discharge.    She has a history of left breast cancer with mastectomy.  She is current on her breast imaging with the breast specialist.    She has osteoporosis, T-score -2.8 over the left hip.  She is on Prolia through Rheumatology.  The patient is off anastrozole.    She is current on her colonoscopy.    Personal health questionnaire reviewed: yes.     Gynecologic History  No LMP recorded (lmp unknown). Patient is postmenopausal.  Contraception: post menopausal status  Last Pap: 23. Results were: normal  Last mammogram: 23. Results were: normal    Obstetric History  OB History    Para Term  AB Living   2 2           SAB IAB Ectopic Multiple Live Births                  # Outcome Date GA Lbr Raffaele/2nd Weight Sex Delivery Anes PTL Lv   2 Para            1 Para                Objective   Constitutional: Alert and in no acute distress. Well developed, well nourished.   Head and Face: Head and face: Normal.    Eyes: Normal external exam - nonicteric sclera, extraocular movements intact (EOMI) and no ptosis.   Neck: No neck asymmetry. Supple. Thyroid not enlarged and there were no palpable thyroid nodules.    Pulmonary: No respiratory distress.   Chest: Breasts: Normal appearance, no nipple discharge and no skin changes. Palpation of breasts and axillae: No palpable mass and no axillary lymphadenopathy.  The left breast is consistent with mastectomy.  Abdomen: Soft nontender; no abdominal mass palpated. No organomegaly. No hernias.   Genitourinary: External genitalia: Normal. No inguinal lymphadenopathy. Bartholin's Urethral and Skenes Glands: Normal. Urethra: Normal.  Bladder: Normal on palpation. Vagina: Normal. Cervix: Normal.  Uterine prolapse is noted to about 2 cm behind introitus.  Uterus: Normal.  Right  Adnexa/parametria: Normal.  Left Adnexa/parametria: Normal.  Inspection of Perianal Area: Normal.   Musculoskeletal: No joint swelling seen, normal movements of all extremities.   Skin: Normal skin color and pigmentation, normal skin turgor, and no rash.   Neurologic: Non-focal. Grossly intact.   Psychiatric: Alert and oriented x 3. Affect normal to patient baseline. Mood: Appropriate.  Physical Exam     Assessment/Plan   Healthy female exam.  This is a 63-year-old female with a normal exam.  No Pap was sent, she is high risk HPV negative.    She has her mammogram and MRI with oncology.   Discussed the pelvic organ prolapse that is relatively asymptomatic.  I recommend Kegel exercises currently.  I will see her in 1 year.  Education reviewed: self breast exams.

## 2024-07-01 ENCOUNTER — HOSPITAL ENCOUNTER (OUTPATIENT)
Dept: RADIOLOGY | Facility: CLINIC | Age: 64
Discharge: HOME | End: 2024-07-01
Payer: COMMERCIAL

## 2024-07-01 DIAGNOSIS — Z08 ENCOUNTER FOR FOLLOW-UP SURVEILLANCE OF BREAST CANCER: ICD-10-CM

## 2024-07-01 DIAGNOSIS — C50.912 MALIGNANT NEOPLASM OF LEFT BREAST IN FEMALE, ESTROGEN RECEPTOR POSITIVE, UNSPECIFIED SITE OF BREAST (MULTI): ICD-10-CM

## 2024-07-01 DIAGNOSIS — Z85.3 ENCOUNTER FOR FOLLOW-UP SURVEILLANCE OF BREAST CANCER: ICD-10-CM

## 2024-07-01 DIAGNOSIS — Z17.0 MALIGNANT NEOPLASM OF LEFT BREAST IN FEMALE, ESTROGEN RECEPTOR POSITIVE, UNSPECIFIED SITE OF BREAST (MULTI): ICD-10-CM

## 2024-07-01 PROCEDURE — 77067 SCR MAMMO BI INCL CAD: CPT | Mod: RT

## 2024-07-01 PROCEDURE — 77067 SCR MAMMO BI INCL CAD: CPT | Mod: RIGHT SIDE | Performed by: RADIOLOGY

## 2024-07-01 PROCEDURE — 77063 BREAST TOMOSYNTHESIS BI: CPT | Mod: RIGHT SIDE | Performed by: RADIOLOGY

## 2024-07-03 DIAGNOSIS — Z00.00 HEALTH MAINTENANCE EXAMINATION: Primary | ICD-10-CM

## 2024-09-06 DIAGNOSIS — M81.0 AGE-RELATED OSTEOPOROSIS WITHOUT CURRENT PATHOLOGICAL FRACTURE: ICD-10-CM

## 2024-09-19 RX ORDER — CHOLECALCIFEROL (VITAMIN D3) 25 MCG
TABLET ORAL DAILY
Qty: 90 TABLET | Refills: 3 | Status: SHIPPED | OUTPATIENT
Start: 2024-09-19

## 2025-01-07 NOTE — PROGRESS NOTES
Patient ID: Lashay Isaac is a 64 y.o. female.  BREAST CANCER DIAGNOSIS:   Breast         AJCC Edition: 7th (AJCC), Diagnosis Date: 12-Apr-2017, IIA, T2 N0 M0         Treatment History:    1.  03/30/2017: Mammogram: 1. Suspicious spiculated mass in the superior left breast localizing  medially. 2. Suspicious left breast calcifications in the central superior  left breast at posterior depth.3. Left breast masslike asymmetry in the slightly medial superior  quadrant. 4. Right breast asymmetry.     2. 4/06/2017: Bilateral diagnostic mammogram: No mammographic evidence of  malignancy right breast. Suspicious left breast mass and suspicious adjacent left breast  calcifications. Unremarkable left axillary and left internal mammary lymph  nodes.      3. 04/11/2017: Dr. Mondragon in clinic Bilateral ultrasound was done. There  were no abnormal findings on the right. However, in the left breast, at 11:00, 11 cm  from the nipple, there was a 1.2 x 1.6 x 0.9 cm irregular spiculated densely  shadowing hypoechoic mass. Scanning of the left axilla demonstrated four unremarkable lymph nodes. Ultrasound guided core biopsy was done  on April 11, 2017 and this revealed invasive lobular carcinoma, grade 1-2 with associated lobular carcinoma  in situ. Estrogen receptors were positive at 90% and progesterone receptors  positive at 5-10%. HER-2/leonardo was negative.     4. 04/18/2017: Dr. Mondragon in clinic. The  tumor board recommended a trial of an aromatase inhibitor preoperatively to see  if we can shrink this tumor. Because of the location and size of the tumor, it would be helpful if it were smaller.  Plan on seeing the patient with additional breast imaging 2 months into  the trial of neoadjuvant hormonal  therapy.      5. 04/16/2017 Tumor Board: Oncotype testing.  Med Onc referral.   Rad Onc referral.  Consider preop Hormone therapy, Aromatase Inhibitor. Consider Partial Mastectomy or Mastectomy with SLNB.  Consider  combination  chemotherapy.    6.  04/25/2017: Patient underwent Stereotactic vacuum assisted core biopsy of left breast calcifications biopsy showed LCIS/atypical lobular  hyperplasia, in part involving a fibroadenoma with associated calcifications.      7. 05/10/2017 -  Oncotype DX RS on invasive core biopsy was 18 (low risk). LNs likely negative. [id number], Stage IIA.  Anastrozole as neoadjuvant  hormonal therapy initiated 5/11/17.     8. Definitive surgery with left skin and nipple sparing mastectomy, SLN 8/17/17 invasive lobular carcinoma with 0/4 SLN tumor measured 2.3 cm;  [id number]  Stage IIA G1.      9. Anastrazole changed to exemestane 25 mg daily 9/7/17. Twice yearly zolendronic acid  added for osteoporosis and prevention of recurrence 9/28/2017 given Q6 months. Completed zometa 4/2020.   10. Final reconstructive surgery left breast silcone implant reconstruction  and matching right reduction/mastopexy with Dr. Wrae in 2/7/2018  11. 3/2023 Completion exemestane 5.5 years therapy, observational therapy moving forward      Past Medical History: Lashay has a past medical history of Breast cancer (Multi), Encounter for gynecological examination (general) (routine) without abnormal findings (03/20/2017), Encounter for preprocedural laboratory examination (07/07/2017), Encounter for screening mammogram for malignant neoplasm of breast (03/20/2017), Other abnormal and inconclusive findings on diagnostic imaging of breast (07/24/2017), Other abnormal and inconclusive findings on diagnostic imaging of breast, Other abnormal and inconclusive findings on diagnostic imaging of breast (04/11/2017), Personal history of other specified conditions (04/11/2017), and Urinary tract infection (Have had in the past).  Surgical History:  Lashay has a past surgical history that includes Other surgical history (03/05/2018); Other surgical history (03/05/2018); Other surgical history (03/05/2018); Other surgical history (08/27/2017);  Tonsillectomy (2017); Breast biopsy (); and Mastectomy ().  Social Substance History:  ·  Smoking Status former smoker   ·  Tobacco Use denies   ·  Alcohol Use denies   ·  Drug Use denies   ·  Additional History     Social Hx: Works FT as a [employer]. Happily  lives with  Mp with genetic heart issues.   Smoked for about 30-35 years total 1-2 PPD. Stopped 2017. 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  first child born when she was 29  She did not breast feed Used oral contraceptives for perhaps 18 years but never took fertility drugs or hormone replacement therapy.     Family History & Family Oncology History:  No family history of breast or ovarian cancer -Mother with colon cancer in her 70s -Maternal Grandfather with liver cancer  -Maternal Grandmother with melanoma     HISTORY OF PRESENT ILLNESS:  Lashay Isaac is a 64 y.o. female  is here today for breast  cancer treatment follow-up and surveillance. Today I have reviewed with the patient I will be conducting a clinical physical breast exam. Patient has declined a second medical professional today during the exam as a chapperone.      She completed exemestane in 2023 and has been observational therapy. She denies any breast cancer concerns today. She reports her mother passed in August and was enrolled on hospice for age declined.      She denies any chest pain or breathing issues, no cough, no sob but will have some exertional sob that resolves quickly with rest. She reports sometimes at rest will feel her heart have a period racing- has not happened in about a year. She plans to discuss with PCP.      She denies any vision changes or headache issues, dizziness or loss of balance, no falls.      She denies any new or unexplained bone aches or pains. Continues baseline right hip and bilateral shoulder issues. She continues to intermittently  will use ibuprofen- a few times per month.  Continues with rheumatology, has been off Prolia for a year due to some dental work.      She denies any skin lesions or masses, oral sores lesions or infections with exception of left axilla intermittent tenderness that has been ongoing since her breast surgery. She does not feel any masses.      She reports a normal appetite and normal bowel movements, urination normal. She denies any issues with sexual functioning.      She denies any issues with sleep or fatigue     She take 2,000 International Unit(s) daily vitamin D, additional she takes 600 calcium with vitamin D.        Review of Systems - Oncology  ROS 14 points performed, See HPI for exceptions    OBJECTIVE:    VS / Pain:  /78 (BP Location: Right arm, Patient Position: Sitting, BP Cuff Size: Small adult)   Pulse 64   Temp 36.4 °C (97.6 °F) (Temporal)   Wt 75.3 kg (166 lb)   LMP  (LMP Unknown)   BMI 29.41 kg/m²   BSA: 1.83 meters squared   Pain Scale: 0  ECO- Fully active, able to carry on all pre-disease performance w/o restriction.         Physical Exam    Constitutional: Well developed, awake/alert/oriented x4, no distress, alert and cooperative  EYES: Sclera clear  ENMT: mucous membranes moist, no apparent injury, no lesions seen  Head/Neck: Neck supple, no apparent injury, thyroid without mass or tenderness, No JVD, trachea midline, no bruits  Respiratory / Thoracic: Patent airways, clear to all lobes, normal breath sounds with good chest expansion, thorax symmetric.  Cardiovascular: Regular, rate and rhythm with about 4-5 irregular underlying beats. No murmurs, 2+ equal pulses of the extremities, normal auscultated S 1and S 2  GI: Nondistended, soft, non-tender, no rebound tenderness or guarding, no masses palpable, no organomegaly, +BS, no bruits  Musculoskeletal: ROM intact, no joint swelling, normal strength, no spinal tenderness  Extremities: normal extremities, no cyanosis edema,  contusions or wounds, no clubbing  Neurological: alert and oriented x4, intact senses, motor, response and reflexes, normal strength  Breast: Left mastectomy and underlying silicone implant without abnormalities. Right breast mastopexy/reduction. No palpable masses or lesions in either breast.   Lymphatic: No cervical, supraclavicular, infraclavicular or axillary lymphadenopathy  Psychological: Appropriate and talkative mood and behavior  Skin: Warm and dry, no lesions, no rashes, no jaundice      Diagnostic Results   BI mammo right screening tomosynthesis  Narrative: Interpreted By:  Tennille Duncan,   STUDY:  BI MAMMO RIGHT SCREENING TOMOSYNTHESIS;  7/1/2024 7:35 am      ACCESSION NUMBER(S):  [id number]      ORDERING CLINICIAN:  VIRIDIANA ALBRIGHT      INDICATION:  Screening. Left mastectomy. Right breast reduction.      COMPARISON:  06/29/2023 and 06/28/2022.      FINDINGS:  2D and tomosynthesis images were reviewed at 1 mm slice thickness.      Density:  The breast tissue is heterogeneously dense, which may  obscure small masses.      No suspicious masses or calcifications are identified.      Impression: No mammographic evidence of malignancy.      BI-RADS CATEGORY:  BI-RADS Category:  1 Negative.  Recommendation:  Annual Screening.  Recommended Date:  1 Year.  Laterality:  Right.              For any future breast imaging appointments, please call [phone]).          MACRO:  None      Signed by: Tennille Duncan 7/3/2024 5:10 PM  Dictation workstation:   [id number]       MR breast bilateral w IV contrast fast screening self pay 12/01/2023    Narrative  Interpreted By:  Lul Suarez,  STUDY:  BI MR BREAST BILATERAL WITH CONTRAST FAST SCREENING SELF PAY;  12/1/2023 7:28 am    ACCESSION NUMBER(S):  [id number]    ORDERING CLINICIAN:  VIRIDIANA ALBRIGHT    INDICATION:  Dense breast tissue on mammography. History of left breast cancer  status post mastectomy in 2017. Right breast  reduction.    COMPARISON:  Mammograms from 06/29/2023, 06/28/2022, MRs from 08/20/2021,  07/10/2019    TECHNIQUE:  Using a dedicated breast coil, STIR axial and T1-weighted fat  saturation axial images of the breasts were obtained, the latter both  before and after intravenous administration of Gadolinium DTPA.    Intravenous contrast:  15 mL of Dotarem    FINDINGS:  Density:  The breast tissue is heterogeneously dense, which may  obscure small masses.    Status post left mastectomy with silicone implant reconstruction.  Although this exam is not tailored for the evaluation of breast  implants, left breast implant is grossly intact.    There is minimal right breast background enhancement.    RIGHT BREAST:  No suspicious mass or nonmass enhancement is  identified.    No axillary or internal mammary lymphadenopathy is appreciated.    LEFT MASTECTOMY BED: No suspicious mass or nonmass enhancement is  identified.    Stable left internal mammary lymph node measuring up to 6 mm on  subtraction image 79 of 220. No axillary or internal mammary  lymphadenopathy is appreciated.    NON-BREAST FINDINGS:  None.    Impression  No MRI evidence of malignancy in either breast.  Although this exam  is not tailored for the evaluation of breast implants, left breast  implant is grossly intact.    BI-RADS CATEGORY:    BI-RADS Category:  2 Benign.  Recommendation:  Routine Screening Breast MRI in 1 Year.  Recommended Date:  1 Year.  Laterality:  Bilateral.    For any future breast imaging appointments, please call [phone]).      MACRO:  None    Signed by: Lul Suarez 12/4/2023 4:13 PM    Narrative & Impression   Interpreted By:  RENETTA TOPETE MD and DAVE SILVA MD  MRN: [id number]  Patient Name: MAE FRIEDMAN     STUDY:  DIGITAL MAMM SCREENING W/ SPENCER;  6/29/2023 9:42 am     ACCESSION NUMBER(S):  [id number]     ORDERING CLINICIAN:  SHERRI SKAGGS     INDICATION:  Screening. History of left mastectomy in 2017. Right breast  reduction.     COMPARISON:  06/28/2022, 06/25/2021, 06/22/2020     FINDINGS:  2D and tomosynthesis images were reviewed at 1 mm slice thickness.     The breast tissue is heterogeneously dense, which may obscure small  masses.  A reduction pattern is again seen. No suspicious masses or  calcifications are identified.     IMPRESSION:  No mammographic evidence of malignancy.     BI-RADS CATEGORY:  Category: 2 - Benign.  Recommendation: 1 Year Screening.     No visits with results within 6 Week(s) from this visit.   Latest known visit with results is:   Lab on 06/28/2024   Component Date Value Ref Range Status    Cholesterol 06/28/2024 247 (H)  0 - 199 mg/dL Final    HDL-Cholesterol 06/28/2024 49.5  mg/dL Final    Cholesterol/HDL Ratio 06/28/2024 5.0   Final    LDL Calculated 06/28/2024 165 (H)  <=99 mg/dL Final    VLDL 06/28/2024 33  0 - 40 mg/dL Final    Triglycerides 06/28/2024 164 (H)  0 - 149 mg/dL Final    Non HDL Cholesterol 06/28/2024 198 (H)  0 - 149 mg/dL Final    Glucose 06/28/2024 94  74 - 99 mg/dL Final    Sodium 06/28/2024 142  136 - 145 mmol/L Final    Potassium 06/28/2024 4.2  3.5 - 5.3 mmol/L Final    Chloride 06/28/2024 108 (H)  98 - 107 mmol/L Final    Bicarbonate 06/28/2024 25  21 - 32 mmol/L Final    Anion Gap 06/28/2024 13  10 - 20 mmol/L Final    Urea Nitrogen 06/28/2024 15  6 - 23 mg/dL Final    Creatinine 06/28/2024 0.96  0.50 - 1.05 mg/dL Final    eGFR 06/28/2024 67  >60 mL/min/1.73m*2 Final    Calcium 06/28/2024 9.7  8.6 - 10.6 mg/dL Final    WBC 06/28/2024 8.5  4.4 - 11.3 x10*3/uL Final    nRBC 06/28/2024 0.0  0.0 - 0.0 /100 WBCs Final    RBC 06/28/2024 4.49  4.00 - 5.20 x10*6/uL Final    Hemoglobin 06/28/2024 14.1  12.0 - 16.0 g/dL Final    Hematocrit 06/28/2024 40.8  36.0 - 46.0 % Final    MCV 06/28/2024 91  80 - 100 fL Final    MCH 06/28/2024 31.4  26.0 - 34.0 pg Final    MCHC 06/28/2024 34.6  32.0 - 36.0 g/dL Final    RDW 06/28/2024 13.2  11.5 - 14.5 % Final    Platelets 06/28/2024 216   150 - 450 x10*3/uL Final    Thyroid Stimulating Hormone 06/28/2024 4.70 (H)  0.44 - 3.98 mIU/L Final    Thyroxine, Free 06/28/2024 1.03  0.78 - 1.48 ng/dL Final      Lab Results   Component Value Date    WBC 8.5 06/28/2024    HGB 14.1 06/28/2024    HCT 40.8 06/28/2024    MCV 91 06/28/2024     06/28/2024        Assessment/Plan   Malignant neoplasm of left breast in female, estrogen receptor positive, Pathologic: pT2, pN0, cM0, ER+, HER2-  Lashay was seen today for follow-up.  Diagnoses and all orders for this visit:  Malignant neoplasm of left breast in female, estrogen receptor positive, unspecified site of breast  -     Clinic Appointment Request Follow up; VIRIDIANA ALBRIGHT  -     BI mammo bilateral screening tomosynthesis; Future  -     Clinic Appointment Request Follow up; VIRIDIANA ALBRIGHT; Future  Encounter for follow-up surveillance of breast cancer  -     Clinic Appointment Request Follow up; VIRIDIANA ALBRIGHT  -     BI mammo bilateral screening tomosynthesis; Future  -     Clinic Appointment Request Follow up; VIRIDIANA ALBRIGHT; Future  Irregular heart beats  -     ECG 12 lead (Ancillary Performed); Future      Problem List Items Addressed This Visit       Malignant neoplasm of left breast in female, estrogen receptor positive    Relevant Orders    BI mammo bilateral screening tomosynthesis    Clinic Appointment Request Follow up; VIRIDIANA ALBRIGHT    Encounter for follow-up surveillance of breast cancer    Relevant Orders    BI mammo bilateral screening tomosynthesis    Clinic Appointment Request Follow up; VIRIDIANA ALBRIGHT    Irregular heart beats    Relevant Orders    ECG 12 lead (Ancillary Performed)       Left Breast cancer [id number]  Stage IIA G1  - invasive ductal carcinoma , ER (90%), [place]  (5-10%), Her 2 leonardo negative (1+). Oncotype dx score 18. No recommendation for chemotherapy. Preop hormonal therapy per tumor board initiated Anastrozole 5/11/17 for a duration of about 3-4  months. She had left  skin and nipple sparing mastectomy with little significant tumor reduction and 2.3 cm residual mass. Tumor board recommended change in hormonal treatment. Changed from anastrazole to exemestane 9/7/17 with good tolerance. Was completed at 5.5+ years March 2023 given low risk utilizing CTS-5 at 4.1%.   - Final reconstructive surgery left breast silicone implant reconstruction  and matching right reduction/mastopexy with Dr. Ware in 2/7/2018     There is no evidence of breast cancer recurrence based on her clinical exam today. She will continue to follow with me annually. Orders placed today for mammogram AFTER 7/1/25 and next annual follow up to rotate 6 months from annual mammogram.      Bone Health  - H/O ibandronate for osteoporosis. Though with diagnosis of breast CA, zolendronic acid twice yearly initiated for osteoporosis and prevention of recurrence instead of the ibandronate. Adjuvant Zometa x6 dose 9/2017 through 4/2020  Repeat DEXA with osteoporosis T-score -3.1 4/2021, repeat 12/2023 T-score -2.8. Has established with , rheumatology. Has been on Prolia since 9/2022. Rheum will continue to manage DEXA moving forward     General Health Maintenance.   PCP Dr Cochran. Today on exam a few irregular heart beats underlying normal rhythm. Ordered stat 12 lead EKG and then will likely defer to PCP. She is agreeable to this plan. has retired.   Is well established with GYN     At least 25 minutes of direct consultation was spent with the patient today reviewing her cancer care plan, educating and answering questions regarding ongoing follow up, greater than 50% in counseling  and coordination of care.      Treatment Plan:  [No matching plan found]           Thank you for the opportunity to be involved in the care of Lashay Isaac.   We discussed the clinical significance of diagnosis, goals of care and treatment plan in detail.  Please do not hesitate to reach out with any  questions. Thank you.     -------------------------------------------------------------------------------------------------------------------------------  Marysol Douglas MSN, APRN, FNP-C  [facility]  Division of Medical Oncology- Breast   Collaborating Physician Dr. Jeffrey Gelelr   [facility] [place] [address]  Phone: [phone]  Fax: [phone]  Available via Secure Chat    Confidential Peer Review Document-  Privilege  Privileged Pursuant to [place] Revised Code Section 2305.24, .25, .251 & .252

## 2025-01-08 ENCOUNTER — HOSPITAL ENCOUNTER (OUTPATIENT)
Dept: CARDIOLOGY | Facility: HOSPITAL | Age: 65
Discharge: HOME | End: 2025-01-08
Payer: COMMERCIAL

## 2025-01-08 ENCOUNTER — OFFICE VISIT (OUTPATIENT)
Dept: HEMATOLOGY/ONCOLOGY | Facility: CLINIC | Age: 65
End: 2025-01-08
Payer: COMMERCIAL

## 2025-01-08 VITALS
SYSTOLIC BLOOD PRESSURE: 141 MMHG | DIASTOLIC BLOOD PRESSURE: 78 MMHG | TEMPERATURE: 97.6 F | BODY MASS INDEX: 29.41 KG/M2 | HEART RATE: 64 BPM | WEIGHT: 166 LBS

## 2025-01-08 DIAGNOSIS — Z85.3 ENCOUNTER FOR FOLLOW-UP SURVEILLANCE OF BREAST CANCER: ICD-10-CM

## 2025-01-08 DIAGNOSIS — Z08 ENCOUNTER FOR FOLLOW-UP SURVEILLANCE OF BREAST CANCER: ICD-10-CM

## 2025-01-08 DIAGNOSIS — Z17.0 MALIGNANT NEOPLASM OF LEFT BREAST IN FEMALE, ESTROGEN RECEPTOR POSITIVE, UNSPECIFIED SITE OF BREAST: ICD-10-CM

## 2025-01-08 DIAGNOSIS — I49.9 IRREGULAR HEART BEATS: ICD-10-CM

## 2025-01-08 DIAGNOSIS — C50.912 MALIGNANT NEOPLASM OF LEFT BREAST IN FEMALE, ESTROGEN RECEPTOR POSITIVE, UNSPECIFIED SITE OF BREAST: ICD-10-CM

## 2025-01-08 LAB
ATRIAL RATE: 63 BPM
P AXIS: 69 DEGREES
P OFFSET: 197 MS
P ONSET: 145 MS
PR INTERVAL: 134 MS
Q ONSET: 212 MS
QRS COUNT: 10 BEATS
QRS DURATION: 92 MS
QT INTERVAL: 390 MS
QTC CALCULATION(BAZETT): 399 MS
QTC FREDERICIA: 396 MS
R AXIS: -46 DEGREES
T AXIS: 11 DEGREES
T OFFSET: 407 MS
VENTRICULAR RATE: 63 BPM

## 2025-01-08 PROCEDURE — 93010 ELECTROCARDIOGRAM REPORT: CPT | Performed by: STUDENT IN AN ORGANIZED HEALTH CARE EDUCATION/TRAINING PROGRAM

## 2025-01-08 PROCEDURE — 1036F TOBACCO NON-USER: CPT | Performed by: NURSE PRACTITIONER

## 2025-01-08 PROCEDURE — 93005 ELECTROCARDIOGRAM TRACING: CPT

## 2025-01-08 PROCEDURE — 99214 OFFICE O/P EST MOD 30 MIN: CPT | Performed by: NURSE PRACTITIONER

## 2025-01-08 ASSESSMENT — PATIENT HEALTH QUESTIONNAIRE - PHQ9
2. FEELING DOWN, DEPRESSED OR HOPELESS: NOT AT ALL
SUM OF ALL RESPONSES TO PHQ9 QUESTIONS 1 & 2: 0
1. LITTLE INTEREST OR PLEASURE IN DOING THINGS: NOT AT ALL

## 2025-01-08 ASSESSMENT — PAIN SCALES - GENERAL: PAINLEVEL_OUTOF10: 0-NO PAIN

## 2025-01-08 NOTE — PATIENT INSTRUCTIONS
Marysol Douglas APRDWAYNE, CNP follow-up annually Jan 2026. Plan for annual Mammogram AFTER 7/1/25 -I will call once resulted     Can consider MRI again Later 2025 or early 2026.     I have heard a few irregular beats today with your heart- please complete walk in EKG     Gyn Dr. Mariaelena Edwards annually and as needed     DEXA repeat with continue osteoporosis 12/2023, manage with PCP moving forward      Colonoscopy completed 9/2021     PCP Dr Cochran annually and as needed     Dermatology annual skin check     Call with any questions, concerns or treatment intolerance prior to next office visit [phone].

## 2025-02-25 ENCOUNTER — APPOINTMENT (OUTPATIENT)
Dept: RHEUMATOLOGY | Facility: CLINIC | Age: 65
End: 2025-02-25
Payer: COMMERCIAL

## 2025-02-26 ENCOUNTER — APPOINTMENT (OUTPATIENT)
Dept: RHEUMATOLOGY | Facility: CLINIC | Age: 65
End: 2025-02-26
Payer: COMMERCIAL

## 2025-03-12 ENCOUNTER — APPOINTMENT (OUTPATIENT)
Dept: RHEUMATOLOGY | Facility: CLINIC | Age: 65
End: 2025-03-12
Payer: COMMERCIAL

## 2025-04-30 ENCOUNTER — APPOINTMENT (OUTPATIENT)
Dept: RHEUMATOLOGY | Facility: CLINIC | Age: 65
End: 2025-04-30
Payer: COMMERCIAL

## 2025-04-30 VITALS
SYSTOLIC BLOOD PRESSURE: 125 MMHG | BODY MASS INDEX: 28.34 KG/M2 | WEIGHT: 160 LBS | TEMPERATURE: 98.2 F | DIASTOLIC BLOOD PRESSURE: 80 MMHG | OXYGEN SATURATION: 96 % | HEART RATE: 91 BPM

## 2025-04-30 DIAGNOSIS — M81.0 AGE-RELATED OSTEOPOROSIS WITHOUT CURRENT PATHOLOGICAL FRACTURE: Primary | ICD-10-CM

## 2025-04-30 DIAGNOSIS — M81.0 AGE RELATED OSTEOPOROSIS, UNSPECIFIED PATHOLOGICAL FRACTURE PRESENCE: Primary | ICD-10-CM

## 2025-04-30 PROCEDURE — 99214 OFFICE O/P EST MOD 30 MIN: CPT | Performed by: INTERNAL MEDICINE

## 2025-04-30 RX ORDER — DIPHENHYDRAMINE HYDROCHLORIDE 50 MG/ML
50 INJECTION, SOLUTION INTRAMUSCULAR; INTRAVENOUS AS NEEDED
OUTPATIENT
Start: 2025-05-02

## 2025-04-30 RX ORDER — EPINEPHRINE 0.3 MG/.3ML
0.3 INJECTION SUBCUTANEOUS EVERY 5 MIN PRN
OUTPATIENT
Start: 2025-05-02

## 2025-04-30 RX ORDER — ALBUTEROL SULFATE 0.83 MG/ML
3 SOLUTION RESPIRATORY (INHALATION) AS NEEDED
OUTPATIENT
Start: 2025-05-02

## 2025-04-30 RX ORDER — FAMOTIDINE 10 MG/ML
20 INJECTION, SOLUTION INTRAVENOUS ONCE AS NEEDED
OUTPATIENT
Start: 2025-05-02

## 2025-04-30 NOTE — PROGRESS NOTES
Recall  63 y/o female coming in today for f/u of osteoporosis based on dexa scan done on 04/2021 with the lowest t score of -3.1 of the Right femur neck. She also has hx of invasive lobular carcinoma, grade 1-2 with associated lobular carcinoma in situ, ER, IL positive. HER-2/leonardo was negative. She was initially on anastrozole and then was switched to exemestane 25 mg daily 9/7/17. She was started on a monthly pill per patient (boniva?) for osteoporosis which was changed in 4 months to twice yearly zolendronic acid 4mg for osteoporosis and prevention of bone mets. she received this every 6 months. She completed zometa in April 2020, had a repeat dexa in 4 2021 which showed worsening bone density from her 2020 dexa. Her lowest t score in -3.1 in right femoral neck, -2.7 in left femoral neck and -2.4 in her L1-l4 spine. She has been referred to us by her hem-onc doctor to further assist in osteoporosis management as she still has 5 yrs of aromatase inhibitor therapy left.         Current meds  Prolia 60mg Q6M - 09/07/2022, 3/8/2023, 9/2023, 4/24    Chief Complaint: Follow up of OP     History of Present Illness: At today's visit, she has been off Prolia for a year. She is undergoing dental implants and has been off of Prolia for year.   She had a dental implant in 11/24.   She needs a DXA.           Review of Systems  Constitutional: Denies fatigue  Head: Denies headaches or hair loss  Eyes: Denies dry eyes, blurry vision, redness of the eyes, pain in the eyes or H/O uveitis.  ENT: Has dry mouth, but no loss of taste, sores in the mouth  Cardiovascular: Denies chest pain, palpitations  Respiratory: Denies shortness of breath or cough or wheezing  Gastrointestinal: Denies nausea, vomiting, heartburn, abdominal pain , diarrhea or blood in the stool.   Integumentary: Denies photosensitivity, rash or lesions, Raynaud's or psoriasis  Neurological: Denies any numbness or tingling or weakness  Hematologic/Lymphatic:  Denies bleeding, bruising, Raynaud's phenomenon  MSK: As per HPI.            Active Problems  Problems    · Abdominal pain (789.00) (R10.9)   · Abnormal urine finding (791.9) (R82.90)   · Breast cancer, left (174.9) (C50.912)   · Dehydration (276.51) (E86.0)   · Dense breast tissue on mammogram (793.89) (R92.2)   · Dyslipidemia (272.4) (E78.5)   · Encounter for annual routine gynecological examination (V72.31) (Z01.419)   · Encounter for follow-up surveillance of breast cancer (V67.9,V10.3) (Z08,Z85.3)   · Encounter for gynecological examination (V72.31) (Z01.419)   · Encounter for long-term (current) use of high-risk medication (V58.69) (Z79.899)   · Encounter for screening colonoscopy (V76.51) (Z12.11)   · Hematuria (599.70) (R31.9)   · History of sepsis (V12.09) (Z86.19)   · Melanoma (172.9) (C43.9)   · Osteopenia (733.90) (M85.80)   · Osteoporosis (733.00) (M81.0)   · Use of exemestane (Aromasin) (V07.52) (Z79.811)   · Viral syndrome (079.99) (B34.9)   · Visit for screening mammogram (V76.12) (Z12.31)   · Vitamin D deficiency (268.9) (E55.9)   · Weight gain, abnormal (783.1) (R63.5)     Past Medical History  Problems    · History of Abnormal MRI, breast (793.89) (R92.8)   · Resolved Date: 19 Jun 2018   · History of Abnormal screening mammogram (793.80) (R92.8)   · Resolved Date: 19 Jun 2018   · History of Abnormal ultrasound of breast (793.89) (R92.8)   · Resolved Date: 19 Jun 2018   · History of Encounter for gynecological examination (V72.31) (Z01.419)   · Resolved Date: 11 Apr 2017   · History of lump of left breast (V13.89) (Z87.898)   · Resolved Date: 19 Jun 2018   · History of Pre-procedure lab exam (V72.63) (Z01.812)   · Resolved Date: 19 Jun 2018   · History of Screening mammogram, encounter for (V76.12) (Z12.31)   · Resolved Date: 11 Apr 2017     Surgical History  Problems    · History of Breast Surgery Mastopexy Right Breast   · History of Breast Surgery Reconstruction With Tissue Expander   · History  of Breast Surgery Reduction Procedure Right Breast   · History of Mastectomy Left Breast   · History of Tonsillectomy     Family History  Mother    · Family history of colon cancer (V16.0) (Z80.0)  Father    · No pertinent family history  Maternal Grandmother    · Family history of Melanoma  Maternal Uncle    · Family history of Colon cancer     Social History  Problems    · Former smoker (V15.82) (Z87.891)   · Non-smoker (V49.89) (Z78.9)   · Occasional alcohol use     Allergies  Medication    · No Known Drug Allergies  Recorded By: Curtis Mckeon; 3/20/2017 3:12:08 PM     Current Meds  Current Outpatient Medications   Medication Sig Dispense Refill    B complex-vitamin C-folic acid (Dialyvite) 100-1 mg tablet Take by mouth.      caffeine 200 mg Take by mouth.      calcium carbonate 600 mg calcium (1,500 mg) tablet Take 1 tablet (1,500 mg) by mouth once daily.      cholecalciferol (Vitamin D-3) 25 MCG (1000 UT) tablet TAKE 1 TABLET DAILY 90 tablet 3    L. acidophilus/Bifid. animalis (Daily Probiotic) 2.5 billion cell capsule Take by mouth.       Current Facility-Administered Medications   Medication Dose Route Frequency Provider Last Rate Last Admin    denosumab (Prolia) injection 60 mg  60 mg subcutaneous q6 months Tatiana Sagastume MD                   Physical Exam  Blood pressure 125/80, pulse 91, temperature 36.8 °C (98.2 °F), temperature source Temporal, weight 72.6 kg (160 lb), SpO2 96%.  PE:  General - NAD, sitting up in chair, well-groomed, pleasant, AAOx3  Head: Normocephalic, atraumatic  Eyes - PERRLA, EOMI. No conjunctiva injection.   Mouth/ENT - Moist oral and nasal mucosa. No facial rash. No enlarged parotid or submandibular gland. Adequate salivary pooling.  Cardiovascular - Normal S1, S2. Regular rate and rhythm. No murmurs or rubs.  Lungs - Symmetric chest expansion. Clear to auscultation bilaterally.   Skin - No rashes or ulcers. Skin warm and dry. No erythema on bilateral cheeks.  Extremities - No  edema, cyanosis ,or clubbing  Neurological - Alert and oriented x 3,  grossly intact. No focal deficit.  Musculoskeletal - Back: no scoliosis.         12/23    TECHNIQUE:  DEXA BONE DENSITY      FINDINGS:  SPINE L1-L4  Bone Mineral Density: 0.984  T-Score -1.6  Z-Score -0.2  Bone Mineral Density change vs baseline:  9.8%  Bone Mineral Density change vs previous: 12.1%      LEFT FEMUR -TOTAL  Bone Mineral Density: 0.766  T-Score -1.9   Z-Score  -0.8  Bone Mineral Density change vs baseline: 1.7%  Bone Mineral Density change vs previous: -1.2%      LEFT FEMUR -NECK  Bone Mineral Density: 0.653  T-Score -2.8  Z-Score -1.4          World Health Organization (WHO) criteria for post-menopausal,   Women:  Normal:         T-score at or above -1 SD  Osteopenia:   T-score between -1 and -2.5 SD  Osteoporosis: T-score at or below -2.5 SD          10-year Fracture Risk:  Major Osteoporotic Fracture  24.2  Hip Fracture                        3.3     Component      Latest Ref Rng 3/13/2024   GLUCOSE      74 - 99 mg/dL 96    SODIUM      136 - 145 mmol/L 146 (H)    POTASSIUM      3.5 - 5.3 mmol/L 4.2    CHLORIDE      98 - 107 mmol/L 111 (H)    Bicarbonate      21 - 32 mmol/L 25    Anion Gap      10 - 20 mmol/L 14    Blood Urea Nitrogen      6 - 23 mg/dL 19    Creatinine      0.50 - 1.05 mg/dL 0.93    EGFR      >60 mL/min/1.73m*2 69    Calcium      8.6 - 10.6 mg/dL 9.4    Albumin      3.4 - 5.0 g/dL 4.2    Alkaline Phosphatase      33 - 136 U/L 48    Total Protein      6.4 - 8.2 g/dL 6.3 (L)    AST      9 - 39 U/L 20    Bilirubin Total      0.0 - 1.2 mg/dL 0.5    ALT      7 - 45 U/L 22    C-Telopeptide, Beta Cross Linked      pg/mL 79    Vitamin D, 25-Hydroxy, Total      30 - 100 ng/mL 47         COMPARISON:  Most recent prior is from 04/22/2021.      ACCESSION NUMBER(S):  [id number]      ORDERING CLINICIAN:  STELLA MURPHY      TECHNIQUE:  DEXA BONE DENSITY      FINDINGS:  SPINE L1-L4  Bone Mineral Density:  0.984  T-Score -1.6  Z-Score -0.2  Bone Mineral Density change vs baseline:  9.8%  Bone Mineral Density change vs previous: 12.1%      LEFT FEMUR -TOTAL  Bone Mineral Density: 0.766  T-Score -1.9   Z-Score  -0.8  Bone Mineral Density change vs baseline: 1.7%  Bone Mineral Density change vs previous: -1.2%      LEFT FEMUR -NECK  Bone Mineral Density: 0.653  T-Score -2.8  Z-Score -1.4      Assessment/plan: 64 yr old WF with OP. Has been off of Prolia for a year.   Will repeat DXA. Discussed about risk of rebound fracture with stopping Prolia. We could use Evenity for a year and then Reclast for a year and then give her a holiday. Discussed Evenity. No H/O heart attacks.   Markers of bone turnover.    Reviewed and approved by MAGALY HOFFMAN on 4/30/25 at 12:06 PM.

## 2025-04-30 NOTE — PROGRESS NOTES
Per Dr. Sagastume- Prior auth for Evenity .    Entered Evenity orders for [facility], has been off Prolia for a year, labs already entered

## 2025-05-15 ENCOUNTER — DOCUMENTATION (OUTPATIENT)
Dept: INFUSION THERAPY | Facility: CLINIC | Age: 65
End: 2025-05-15
Payer: COMMERCIAL

## 2025-05-15 NOTE — PROGRESS NOTES
CLINICAL CLEARANCE FOR OUTPATIENT INJECTION      Patient to be scheduled for New Start of Evenity injections. (Has been off prolia for a year)    For Diagnosis: Osteoporosis     Labs required prior to first treatment (please order if not ordered / completed):  SERUM Calcium: 9.3   CORRECTED: 9.14  Lab Results   Component Value Date    CALCIUM 9.3 05/13/2025      Lab Results   Component Value Date    ALBUMIN 4.2 05/13/2025      (Serum or Corrected Calcium must be >8.6mg/dl. OR Ionized Calcium must be >1.1 mmol/L or >4.7 mg/dL (depending on resulting agency).  If below WNL - Contact prescribing provider. Labs should be drawn within 28 days of first treatment.)    (Hypocalcemia must be corrected prior to initiation.)    Calcium and Vitamin D on medication list: Yes  Current Outpatient Medications   Medication Instructions    B complex-vitamin C-folic acid (Dialyvite) 100-1 mg tablet Take by mouth.    caffeine 200 mg Take by mouth.    calcium carbonate 1,500 mg, Daily    cholecalciferol (Vitamin D-3) 25 MCG (1000 UT) tablet oral, Daily    L. acidophilus/Bifid. animalis (Daily Probiotic) 2.5 billion cell capsule Take by mouth.    romosozumab (EVENITY) 210 mg, subcutaneous, Every 28 days        Any history of MI or stroke within the last year? No    Patient Active Problem List   Diagnosis    Malignant neoplasm of left breast in female, estrogen receptor positive    Encounter for follow-up surveillance of breast cancer    Health maintenance examination    Former smoker    Irregular heart beats    Age-related osteoporosis without current pathological fracture      Past Medical History:   Diagnosis Date    Breast cancer (Multi)     Encounter for gynecological examination (general) (routine) without abnormal findings 03/20/2017    Encounter for gynecological examination    Encounter for preprocedural laboratory examination 07/07/2017    Pre-procedure lab exam    Encounter for screening mammogram for malignant neoplasm of  breast 03/20/2017    Screening mammogram, encounter for    Other abnormal and inconclusive findings on diagnostic imaging of breast 07/24/2017    Abnormal MRI, breast    Other abnormal and inconclusive findings on diagnostic imaging of breast     Abnormal screening mammogram    Other abnormal and inconclusive findings on diagnostic imaging of breast 04/11/2017    Abnormal ultrasound of breast    Personal history of other specified conditions 04/11/2017    History of lump of left breast    Urinary tract infection Have had in the past        No noted dental work in the past 4 weeks per chart review. Nurse to confirm at visit.    Urine Hcg test ordered prior to first injection? Not applicable       Last injection received: NA (if continuation)   Due: anytime    Okay to schedule for treatment as ordered by prescribing provider

## 2025-05-17 LAB
25(OH)D3+25(OH)D2 SERPL-MCNC: 54 NG/ML (ref 30–100)
ALBUMIN SERPL-MCNC: 4.2 G/DL (ref 3.6–5.1)
ALBUMIN/GLOB SERPL: 2.5 (CALC) (ref 1–2.5)
ALP BONE SERPL-MCNC: 9.8 MCG/L (ref 5.6–29)
ALP SERPL-CCNC: 56 U/L (ref 37–153)
ALT SERPL-CCNC: 19 U/L (ref 6–29)
AST SERPL-CCNC: 22 U/L (ref 10–35)
BILIRUB SERPL-MCNC: 0.5 MG/DL (ref 0.2–1.2)
BUN SERPL-MCNC: 17 MG/DL (ref 7–25)
BUN/CREAT SERPL: ABNORMAL (CALC) (ref 6–22)
CALCIUM SERPL-MCNC: 9.3 MG/DL (ref 8.6–10.4)
CHLORIDE SERPL-SCNC: 113 MMOL/L (ref 98–110)
CO2 SERPL-SCNC: 22 MMOL/L (ref 20–32)
COLLAGEN CTX SERPL-MCNC: 214 PG/ML
CREAT SERPL-MCNC: 0.93 MG/DL (ref 0.5–1.05)
EGFRCR SERPLBLD CKD-EPI 2021: 69 ML/MIN/1.73M2
GLOBULIN SER CALC-MCNC: 1.7 G/DL (CALC) (ref 1.9–3.7)
GLUCOSE SERPL-MCNC: 97 MG/DL (ref 65–99)
POTASSIUM SERPL-SCNC: 4.4 MMOL/L (ref 3.5–5.3)
PROT SERPL-MCNC: 5.9 G/DL (ref 6.1–8.1)
SODIUM SERPL-SCNC: 144 MMOL/L (ref 135–146)

## 2025-05-28 ASSESSMENT — PROMIS GLOBAL HEALTH SCALE
RATE_MENTAL_HEALTH: VERY GOOD
RATE_GENERAL_HEALTH: GOOD
EMOTIONAL_PROBLEMS: RARELY
RATE_AVERAGE_PAIN: 3
CARRYOUT_PHYSICAL_ACTIVITIES: COMPLETELY
RATE_SOCIAL_SATISFACTION: VERY GOOD
RATE_AVERAGE_FATIGUE: MILD
RATE_PHYSICAL_HEALTH: GOOD
RATE_QUALITY_OF_LIFE: GOOD
CARRYOUT_SOCIAL_ACTIVITIES: EXCELLENT

## 2025-06-02 PROBLEM — E55.9 VITAMIN D DEFICIENCY: Status: ACTIVE | Noted: 2025-06-02

## 2025-06-02 PROBLEM — E78.5 DYSLIPIDEMIA: Status: ACTIVE | Noted: 2025-06-02

## 2025-06-02 PROBLEM — Z90.12 STATUS POST LEFT MASTECTOMY: Status: ACTIVE | Noted: 2018-01-11

## 2025-06-02 PROBLEM — R63.5 WEIGHT GAIN, ABNORMAL: Status: ACTIVE | Noted: 2025-06-02

## 2025-06-02 PROBLEM — B34.9 VIRAL SYNDROME: Status: ACTIVE | Noted: 2025-06-02

## 2025-06-02 PROBLEM — N39.0 URINARY TRACT INFECTIOUS DISEASE: Status: ACTIVE | Noted: 2025-06-02

## 2025-06-02 PROBLEM — E86.0 DEHYDRATION: Status: ACTIVE | Noted: 2025-06-02

## 2025-06-02 PROBLEM — R92.30 DENSE BREAST TISSUE ON MAMMOGRAM: Status: ACTIVE | Noted: 2025-06-02

## 2025-06-02 PROBLEM — Z86.19 HISTORY OF SEPSIS: Status: ACTIVE | Noted: 2025-06-02

## 2025-06-02 PROBLEM — N64.89 ACQUIRED BREAST DEFORMITY: Status: ACTIVE | Noted: 2018-01-11

## 2025-06-02 PROBLEM — M85.80 OSTEOPENIA: Status: ACTIVE | Noted: 2025-06-02

## 2025-06-02 PROBLEM — R10.9 ABDOMINAL PAIN: Status: ACTIVE | Noted: 2025-06-02

## 2025-06-02 PROBLEM — C50.919 MALIGNANT NEOPLASM OF BREAST: Status: ACTIVE | Noted: 2024-02-27

## 2025-06-02 PROBLEM — Z85.3 PERSONAL HISTORY OF BREAST CANCER: Status: ACTIVE | Noted: 2018-01-11

## 2025-06-02 PROBLEM — C43.9 MELANOMA (MULTI): Status: ACTIVE | Noted: 2025-06-02

## 2025-06-02 PROBLEM — R31.9 HEMATURIA: Status: ACTIVE | Noted: 2025-06-02

## 2025-06-03 ENCOUNTER — APPOINTMENT (OUTPATIENT)
Dept: PRIMARY CARE | Facility: CLINIC | Age: 65
End: 2025-06-03
Payer: COMMERCIAL

## 2025-06-03 VITALS
WEIGHT: 159.4 LBS | SYSTOLIC BLOOD PRESSURE: 134 MMHG | OXYGEN SATURATION: 99 % | DIASTOLIC BLOOD PRESSURE: 80 MMHG | TEMPERATURE: 97.2 F | HEIGHT: 63 IN | BODY MASS INDEX: 28.24 KG/M2 | HEART RATE: 68 BPM

## 2025-06-03 DIAGNOSIS — Z87.891 FORMER SMOKER: ICD-10-CM

## 2025-06-03 DIAGNOSIS — R79.89 ABNORMAL THYROID BLOOD TEST: ICD-10-CM

## 2025-06-03 DIAGNOSIS — M81.0 AGE-RELATED OSTEOPOROSIS WITHOUT CURRENT PATHOLOGICAL FRACTURE: ICD-10-CM

## 2025-06-03 DIAGNOSIS — E78.5 DYSLIPIDEMIA: ICD-10-CM

## 2025-06-03 DIAGNOSIS — Z00.00 HEALTH MAINTENANCE EXAMINATION: Primary | ICD-10-CM

## 2025-06-03 PROBLEM — B34.9 VIRAL SYNDROME: Status: RESOLVED | Noted: 2025-06-02 | Resolved: 2025-06-03

## 2025-06-03 PROBLEM — M85.80 OSTEOPENIA: Status: RESOLVED | Noted: 2025-06-02 | Resolved: 2025-06-03

## 2025-06-03 PROBLEM — R63.5 WEIGHT GAIN, ABNORMAL: Status: RESOLVED | Noted: 2025-06-02 | Resolved: 2025-06-03

## 2025-06-03 PROBLEM — E86.0 DEHYDRATION: Status: RESOLVED | Noted: 2025-06-02 | Resolved: 2025-06-03

## 2025-06-03 PROCEDURE — 3008F BODY MASS INDEX DOCD: CPT | Performed by: FAMILY MEDICINE

## 2025-06-03 PROCEDURE — 1036F TOBACCO NON-USER: CPT | Performed by: FAMILY MEDICINE

## 2025-06-03 PROCEDURE — 99396 PREV VISIT EST AGE 40-64: CPT | Performed by: FAMILY MEDICINE

## 2025-06-03 ASSESSMENT — ENCOUNTER SYMPTOMS
OCCASIONAL FEELINGS OF UNSTEADINESS: 0
DEPRESSION: 0
LOSS OF SENSATION IN FEET: 0

## 2025-06-03 NOTE — PROGRESS NOTES
"Reason for Visit: Annual Physical Exam    HPI:    Presents for wellness   Doing well overall  Followed up with oncology  Starting new medication for osteoporosis    Active Problem List  Problem List[1]    Comprehensive Medical/Surgical/Social/Family History  Medical History[2]  Surgical History[3]  Social History[4]  Family History[5]      Allergies and Medications  Patient has no known allergies.  Medications Ordered Prior to Encounter[6]      ROS otherwise negative aside from what was mentioned above in HPI.    Vitals  /80 (BP Location: Right arm, Patient Position: Sitting, BP Cuff Size: Adult)   Pulse 68   Temp 36.2 °C (97.2 °F) (Temporal)   Ht 1.6 m (5' 3\")   Wt 72.3 kg (159 lb 6.4 oz)   LMP  (LMP Unknown)   SpO2 99%   BMI 28.24 kg/m²   Body mass index is 28.24 kg/m².  Physical Exam  Gen: Alert, NAD  HEENT:  PERRL, EOMI, conjunctiva and sclera normal in appearance. External auditory canals/TMs normal; Oral cavity and posterior pharynx without lesions/exudate  Neck:  Supple with FROM; No masses/nodes palpable; Thyroid nontender and without nodules; No LENORE  Respiratory:  Lungs CTAB  Cardiovascular:  Heart RRR. No M/R/G. Peripheral pulses equal bilaterally  Abdomen:  Soft, nontender, No R/G/R; No HSM or masses palpated  Extremities:  FROM all extremities; Muscle strength grossly normal with good tone  Neuro:  CN II-XII intact;  Gross motor and sensory intact  Skin:  No suspicious lesions present    Assessment and Plan:  Problem List Items Addressed This Visit       Health maintenance examination - Primary    Current Assessment & Plan   Recommended screening guidelines addressed and orders placed as indicated by age and chronic conditions  Screening labs ordered, will call with results  Mammogram through onc/GYN  Rheumatology for osteoporosis  Advise calcium score for hx of HLD, former smoker  Continue to work on healthy lifestyle including well balanced diet, regular activity, limit alcohol, no tobacco " products   Follow up annually           Relevant Orders    Hemoglobin A1C    TSH with reflex to Free T4 if abnormal    Lipid Panel    CBC    Former smoker    Relevant Orders    CT lung screening low dose    CT cardiac scoring wo IV contrast    CBC    Osteoporosis    Dyslipidemia    Relevant Orders    CT cardiac scoring wo IV contrast    Hemoglobin A1C    Lipid Panel     Other Visit Diagnoses         Abnormal thyroid blood test        Relevant Orders    TSH with reflex to Free T4 if abnormal              Devora Cochran MD         [1]   Patient Active Problem List  Diagnosis    Malignant neoplasm of breast    Personal history of breast cancer    Health maintenance examination    Former smoker    Irregular heart beats    Osteoporosis    Abdominal pain    Acquired breast deformity    Dense breast tissue on mammogram    Dyslipidemia    Hematuria    History of sepsis    Melanoma (Multi)    Status post left mastectomy    Urinary tract infectious disease    Vitamin D deficiency   [2]   Past Medical History:  Diagnosis Date    Breast cancer     Dehydration 06/02/2025    Encounter for gynecological examination (general) (routine) without abnormal findings 03/20/2017    Encounter for gynecological examination    Encounter for preprocedural laboratory examination 07/07/2017    Pre-procedure lab exam    Encounter for screening mammogram for malignant neoplasm of breast 03/20/2017    Screening mammogram, encounter for    Other abnormal and inconclusive findings on diagnostic imaging of breast 07/24/2017    Abnormal MRI, breast    Other abnormal and inconclusive findings on diagnostic imaging of breast     Abnormal screening mammogram    Other abnormal and inconclusive findings on diagnostic imaging of breast 04/11/2017    Abnormal ultrasound of breast    Personal history of other specified conditions 04/11/2017    History of lump of left breast    Urinary tract infection Have had in the past   [3]   Past Surgical History:  Procedure  Laterality Date    BREAST BIOPSY  4/17    MASTECTOMY  8/17    OTHER SURGICAL HISTORY  03/05/2018    Mastectomy Left Breast    OTHER SURGICAL HISTORY  03/05/2018    Breast Surgery Reduction Procedure Right Breast    OTHER SURGICAL HISTORY  03/05/2018    Breast Surgery Mastopexy Right Breast    OTHER SURGICAL HISTORY  08/27/2017    Breast Surgery Reconstruction With Tissue Expander    TONSILLECTOMY  04/11/2017    Tonsillectomy   [4]   Social History  Tobacco Use    Smoking status: Former     Current packs/day: 0.00     Average packs/day: 1 pack/day for 37.0 years (37.0 ttl pk-yrs)     Types: Cigarettes     Start date: 1/1/1978     Quit date: 1/1/2015     Years since quitting: 10.4    Smokeless tobacco: Never   Substance Use Topics    Alcohol use: Not Currently     Alcohol/week: 1.0 standard drink of alcohol     Comment: On occasion    Drug use: Never   [5]   Family History  Problem Relation Name Age of Onset    Colon cancer Mother Malia Sanchez     Hearing loss Mother Malia Sanchez     Hypertension Mother Malia Sanchez     Cancer Mother Malia Sanchez     Hypertension Maternal Grandmother Aleksandr Landeros     Cancer Maternal Grandmother Aleksandr Landeros     Colon cancer Mother's Brother Gabe Landeros     Cancer Mother's Brother Gabe Landeros     Stroke Mother's Brother Meliton Landeros    [6]   Current Outpatient Medications on File Prior to Visit   Medication Sig Dispense Refill    B complex-vitamin C-folic acid (Dialyvite) 100-1 mg tablet Take by mouth.      caffeine 200 mg Take by mouth.      calcium carbonate 600 mg calcium (1,500 mg) tablet Take 1 tablet (1,500 mg) by mouth once daily.      cholecalciferol (Vitamin D-3) 25 MCG (1000 UT) tablet TAKE 1 TABLET DAILY 90 tablet 3    esomeprazole magnesium (NEXIUM ORAL) Take by mouth.      L. acidophilus/Bifid. animalis (Daily Probiotic) 2.5 billion cell capsule Take by mouth.      romosozumab (Evenity) 210mg/2.34mL ( 105mg/1.17mLx2) prefilled syringe Inject 2 Syringes (210 mg)  under the skin every 28 (twenty-eight) days. 2 each 11     No current facility-administered medications on file prior to visit.

## 2025-06-03 NOTE — ASSESSMENT & PLAN NOTE
Recommended screening guidelines addressed and orders placed as indicated by age and chronic conditions  Screening labs ordered, will call with results  Mammogram through onc/GYN  Rheumatology for osteoporosis  Advise calcium score for hx of HLD, former smoker  Continue to work on healthy lifestyle including well balanced diet, regular activity, limit alcohol, no tobacco products   Follow up annually

## 2025-06-04 LAB
CHOLEST SERPL-MCNC: 216 MG/DL
CHOLEST/HDLC SERPL: 3.9 (CALC)
ERYTHROCYTE [DISTWIDTH] IN BLOOD BY AUTOMATED COUNT: 13.1 % (ref 11–15)
EST. AVERAGE GLUCOSE BLD GHB EST-MCNC: 114 MG/DL
EST. AVERAGE GLUCOSE BLD GHB EST-SCNC: 6.3 MMOL/L
HBA1C MFR BLD: 5.6 %
HCT VFR BLD AUTO: 42.2 % (ref 35–45)
HDLC SERPL-MCNC: 56 MG/DL
HGB BLD-MCNC: 14 G/DL (ref 11.7–15.5)
LDLC SERPL CALC-MCNC: 137 MG/DL (CALC)
MCH RBC QN AUTO: 31.3 PG (ref 27–33)
MCHC RBC AUTO-ENTMCNC: 33.2 G/DL (ref 32–36)
MCV RBC AUTO: 94.2 FL (ref 80–100)
NONHDLC SERPL-MCNC: 160 MG/DL (CALC)
PLATELET # BLD AUTO: 206 THOUSAND/UL (ref 140–400)
PMV BLD REES-ECKER: 10.5 FL (ref 7.5–12.5)
RBC # BLD AUTO: 4.48 MILLION/UL (ref 3.8–5.1)
TRIGL SERPL-MCNC: 115 MG/DL
TSH SERPL-ACNC: 1.73 MIU/L (ref 0.4–4.5)
WBC # BLD AUTO: 8.2 THOUSAND/UL (ref 3.8–10.8)

## 2025-06-10 ENCOUNTER — APPOINTMENT (OUTPATIENT)
Dept: INFUSION THERAPY | Facility: CLINIC | Age: 65
End: 2025-06-10
Payer: COMMERCIAL

## 2025-06-10 VITALS
DIASTOLIC BLOOD PRESSURE: 81 MMHG | RESPIRATION RATE: 16 BRPM | OXYGEN SATURATION: 97 % | HEART RATE: 78 BPM | SYSTOLIC BLOOD PRESSURE: 141 MMHG | TEMPERATURE: 98.1 F

## 2025-06-10 DIAGNOSIS — M81.0 OSTEOPOROSIS WITHOUT CURRENT PATHOLOGICAL FRACTURE, UNSPECIFIED OSTEOPOROSIS TYPE: ICD-10-CM

## 2025-06-10 DIAGNOSIS — M81.0 OSTEOPOROSIS: ICD-10-CM

## 2025-06-10 PROCEDURE — 96372 THER/PROPH/DIAG INJ SC/IM: CPT | Performed by: NURSE PRACTITIONER

## 2025-06-10 RX ORDER — ALBUTEROL SULFATE 0.83 MG/ML
3 SOLUTION RESPIRATORY (INHALATION) AS NEEDED
OUTPATIENT
Start: 2025-07-08

## 2025-06-10 RX ORDER — FAMOTIDINE 10 MG/ML
20 INJECTION, SOLUTION INTRAVENOUS ONCE AS NEEDED
OUTPATIENT
Start: 2025-07-08

## 2025-06-10 RX ORDER — EPINEPHRINE 0.3 MG/.3ML
0.3 INJECTION SUBCUTANEOUS EVERY 5 MIN PRN
OUTPATIENT
Start: 2025-07-08

## 2025-06-10 RX ORDER — DIPHENHYDRAMINE HYDROCHLORIDE 50 MG/ML
50 INJECTION, SOLUTION INTRAMUSCULAR; INTRAVENOUS AS NEEDED
OUTPATIENT
Start: 2025-07-08

## 2025-06-10 ASSESSMENT — ENCOUNTER SYMPTOMS
PALPITATIONS: 0
COUGH: 0
WOUND: 0
SHORTNESS OF BREATH: 0
LEG SWELLING: 0
EXTREMITY WEAKNESS: 0
NUMBNESS: 0
DIZZINESS: 0
WHEEZING: 0
LIGHT-HEADEDNESS: 0

## 2025-06-10 NOTE — PATIENT INSTRUCTIONS
Today :We administered romosozumab. Evenity 210mg(total) subcutaneous injections  105mg right upper arm-anterior  105mg right upper arm-superior  #1 of 12 doses    For:   1. Osteoporosis    2. Osteoporosis without current pathological fracture, unspecified osteoporosis type       Your next appointment is due in:  4 weeks        Please read the  Medication Guide that was given to you and reviewed during todays visit.     (Tell all doctors including dentists that you are taking this medication)     Go to the emergency room or call 911 if:  -You have signs of allergic reaction:   -Rash, hives, itching.   -Swollen, blistered, peeling skin.   -Swelling of face, lips, mouth, tongue or throat.   -Tightness of chest, trouble breathing, swallowing or talking     Call your doctor:  - If injection sites gets red, warm, swollen, itchy or leaks fluid or pus.     (Leave band aids  on your injection sites for at least 2 hours and keep area clean and dry)  - If you get sick or have symptoms of infection or are not feeling well for any reason.    (Wash your hands often, stay away from people who are sick)  - If you have side effects from your medication that do not go away or are bothersome.     (Refer to the teaching your nurse gave you for side effects to call your doctor about)    - Common side effects may include:  stuffy nose, headache, feeling tired, muscle aches, upset stomach  - Before receiving any vaccines     - Call the [facility] at   If:  - You get sick, are on antibiotics, have had a recent vaccine, have surgery or dental work and your doctor wants your visit rescheduled.  - You need to cancel and reschedule your visit for any reason. Call at least 2 days before your visit if you need to cancel.   - Your insurance changes before your next visit.    (We will need to get approval from your new insurance. This can take up to two weeks.)     The [facility] is opened Monday thru Friday. We  are closed on weekends and holidays.   Voice mail will take your call if the center is closed. If you leave a message please allow 24 hours for a call back during weekdays. If you leave a message on a weekend/holiday, we will call you back the next business day.    A pharmacist is available Monday - Friday from 8:30AM to 3:30PM to help answer any questions you may have about your prescriptions(s). Please call pharmacy at:    [facility]: [phone]  [facility]: [phone]  [facility]: [phone]

## 2025-06-10 NOTE — PROGRESS NOTES
[facility]   Infusion Clinic Note   Date: Martha 10, 2025   Name: Lashay Isaac  : 1960   MRN: [id number]         Reason for Visit: Injections and New Patient Visit (Evenity 210mg(total0 subcutaneous injections/#1 of 12 doses)         Today: We administered romosozumab.       Ordered By: Tatiana Sagastume MD       For a Diagnosis of: Osteoporosis    Osteoporosis without current pathological fracture, unspecified osteoporosis type       At today's visit patient accompanied by: Self      Today's Vitals:   Vitals:    06/10/25 0755   BP: 141/81   Pulse: 78   Resp: 16   Temp: 36.7 °C (98.1 °F)   TempSrc: Temporal   SpO2: 97%             Pre - Treatment Checklist:      - Previous reaction to current treatment: n/a      (Assess patient for the concerns below. Document provider notification as appropriate).  - Active or recent infection with/without current antibiotic use: no  - Recent or planned invasive dental work: no  - Recent or planned surgeries: no  - Recently received or plans to receive vaccinations: no  - Has treatment related toxicities: no  - Any chance may be pregnant:  no      Pain: 0   - Is the pain different from normal: n/a   - Is prescribing Doctor aware:  n/a      Labs: Reviewed       Fall Risk Screening: Pedraza Fall Risk  History of Falling, Immediate or Within 3 Months: No  Secondary Diagnosis: Yes  Ambulatory Aid: Walks without aid/bedrest/nurse assist  Intravenous Therapy/Heparin Lock: No  Gait/Transferring: Normal/bedrest/immobile  Mental Status: Oriented to own ability  Pedraza Fall Risk Score: 15       Review Of Systems:  Review of Systems   Respiratory:  Negative for cough, shortness of breath and wheezing.    Cardiovascular:  Negative for chest pain, leg swelling and palpitations.   Skin:  Negative for itching, rash and wound.   Neurological:  Negative for dizziness, extremity weakness, light-headedness and numbness.         Infusion Readiness:  - Assessment Concerns  Related to Infusion: No  - Provider notified: n/a      New Patient Education:    NEW PATIENT MEDICATION EDUCATION PT PROVIDED WITH WRITTEN ([employer] PT EDUCATION SHEET) AND VERBAL EDUCATION REGARDING MEDICATION GIVEN. VERIFIED MEDICATION NAME WITH PATIENT AND DISCUSSED REASON FOR USE. BRIEFLY DISCUSSED HOW MEDICATION WORKS AND EDUCATED ON GOAL OF TREATMENT, FREQUENCY OF TREATMENT, ADVERSE RXN'S AND COMMON SIDE EFFECTS TO MONITOR FOR. INSTRUCTED PT TO ASSURE THAT ALL PROVIDERS INCLUDING DENTISTS ARE AWARE OF MEDICATION RECEIVED. DISCUSSED FLOW OF VISIT AND ORIENTED TO INFUSION CENTER. PT VERBALIZES UNDERSTANDING. CALL LIGHT PROVIDED AND PT AWARE TO ALERT STAFF OF ANY CONCERNS DURING TREATMENT.        Treatment Conditions & Drug Specific Questions:    Romosozumab  (EVENITY)    (Unless otherwise specified on patient specific therapy plan):     TREATMENT CONDITIONS:  Unless otherwise specified on patient specific therapy plan HOLD and notify provider prior to proceeding with treatment if:   o Corrected or Serum Calcium LESS THAN 8.6 mg/dL  OR Ionized calcium less than 1.1 mmol/L or less than 4.7 mg/dL (depending on resulting agency)  - Invasive dental work within or scheduled in the next 4 weeks    Lab Results   Component Value Date    CALCIUM 9.3 05/13/2025      Patient meets treatment conditions? Yes    DRUG SPECIFIC QUESTIONS:  Are you taking Calcium and Vitamin D supplements?  Yes  (Recommended)    Are you aware of the risk of heart attack, stroke, osteonecrosis of the jaw and/or atypical femur fractures associated with this drug?  No    Any history of heart attack or stroke in the last year?  No  (Romosozumab (evenity) should not be initiated in patients who have had a myocardial infarction or stroke within the preceding year)    Education regarding signs and symptoms of heart attack and stroke provided? Yes  (Signs and symptoms may include chest pain or pressure, shortness of breath, feeling lightheaded or dizzy,  headache, numbness or weakness in face, arms or legs, difficulty talking, change in vision or loss of balance)      REMINDER:   PREGNANCY CATEGORY X DRUG. OBTAIN NEGATIVE PREGNANCY TEST PRIOR TO FIRST INJECTION FOR WOMEN OF CHILDBEARING ABILITY     Recommended Vitals/Observation:  Vitals: Obtain vital signs prior to injection and as needed.   Observation: Patient may leave immediately following injection.        Weight Based Drug Calculations:    WEIGHT BASED DRUGS: NOT APPLICABLE / FLAT DOSE       Post Treatment: Patient tolerated treatment without issue and was discharged in no apparent distress.      Note Authored / Patient Cared for By: Maria Luz Barclay LPN

## 2025-06-26 ENCOUNTER — HOSPITAL ENCOUNTER (OUTPATIENT)
Dept: RADIOLOGY | Facility: CLINIC | Age: 65
Discharge: HOME | End: 2025-06-26
Payer: COMMERCIAL

## 2025-06-26 DIAGNOSIS — Z87.891 FORMER SMOKER: ICD-10-CM

## 2025-06-26 PROCEDURE — 71271 CT THORAX LUNG CANCER SCR C-: CPT

## 2025-07-01 ENCOUNTER — APPOINTMENT (OUTPATIENT)
Dept: OBSTETRICS AND GYNECOLOGY | Facility: CLINIC | Age: 65
End: 2025-07-01
Payer: COMMERCIAL

## 2025-07-01 VITALS
DIASTOLIC BLOOD PRESSURE: 75 MMHG | BODY MASS INDEX: 27.46 KG/M2 | WEIGHT: 155 LBS | SYSTOLIC BLOOD PRESSURE: 132 MMHG | HEIGHT: 63 IN

## 2025-07-01 DIAGNOSIS — N90.4 LICHEN SCLEROSUS OF FEMALE GENITALIA: ICD-10-CM

## 2025-07-01 DIAGNOSIS — Z01.419 ENCOUNTER FOR GYNECOLOGICAL EXAMINATION WITHOUT ABNORMAL FINDING: Primary | ICD-10-CM

## 2025-07-01 PROCEDURE — 3008F BODY MASS INDEX DOCD: CPT | Performed by: OBSTETRICS & GYNECOLOGY

## 2025-07-01 PROCEDURE — 1036F TOBACCO NON-USER: CPT | Performed by: OBSTETRICS & GYNECOLOGY

## 2025-07-01 PROCEDURE — 99396 PREV VISIT EST AGE 40-64: CPT | Performed by: OBSTETRICS & GYNECOLOGY

## 2025-07-01 RX ORDER — CLOBETASOL PROPIONATE 0.5 MG/G
OINTMENT TOPICAL
Qty: 15 G | Refills: 2 | Status: SHIPPED | OUTPATIENT
Start: 2025-07-01

## 2025-07-01 ASSESSMENT — PATIENT HEALTH QUESTIONNAIRE - PHQ9
2. FEELING DOWN, DEPRESSED OR HOPELESS: NOT AT ALL
1. LITTLE INTEREST OR PLEASURE IN DOING THINGS: NOT AT ALL
SUM OF ALL RESPONSES TO PHQ9 QUESTIONS 1 & 2: 0

## 2025-07-01 ASSESSMENT — PAIN SCALES - GENERAL: PAINLEVEL_OUTOF10: 0-NO PAIN

## 2025-07-01 NOTE — PROGRESS NOTES
Subjective   Lashay FRAZIER Lader is a 64 y.o. female here for a routine exam.  She has a history of a left mastectomy and is current on her breast imaging with the breast specialist.    She has osteoporosis, her bone density 2023 showed T-score of the left hip -2.8.  She is currently on Evenity and followed by Rheumatology.    She has no postmenopausal bleeding or discharge.  No dysuria or change in bowel habits.  She is current on her colonoscopy.    She does mention some itchiness at the vulvar area, mainly at night.     Personal health questionnaire reviewed: yes.     Gynecologic History  No LMP recorded (lmp unknown). Patient is postmenopausal.  Contraception: post menopausal status  Last Pap: 23. Results were: normal  Last mammogram: 24. Results were: normal    Obstetric History  OB History    Para Term  AB Living   2 2 2   2   SAB IAB Ectopic Multiple Live Births             # Outcome Date GA Lbr Raffaele/2nd Weight Sex Type Anes PTL Lv   2 Term            1 Term                Objective   Constitutional: Alert and in no acute distress. Well developed, well nourished.   Head and Face: Head and face: Normal.    Eyes: Normal external exam - nonicteric sclera, extraocular movements intact (EOMI) and no ptosis.   Neck: No neck asymmetry. Supple. Thyroid not enlarged and there were no palpable thyroid nodules.    Pulmonary: No respiratory distress.   Chest: Breasts: Normal appearance, no nipple discharge and no skin changes. Palpation of breasts and axillae: No palpable mass and no axillary lymphadenopathy.  Left breast is consistent mastectomy.  Abdomen: Soft nontender; no abdominal mass palpated. No organomegaly. No hernias.   Genitourinary: External genitalia: The upper labia and vulvar area appear atrophic but normal.  There is some pigmentation change and skin changes suspicious for lichen sclerosis near the posterior vulva and introital area.   No ulcerations.  No inguinal  lymphadenopathy. Bartholin's Urethral and Skenes Glands: Normal. Urethra: Normal.  Bladder: Normal on palpation. Vagina: Normal. Cervix: Normal.  Uterus: Normal.  Right Adnexa/parametria: Normal.  Left Adnexa/parametria: Normal.  Inspection of Perianal Area: Normal.   Musculoskeletal: No joint swelling seen, normal movements of all extremities.   Skin: Normal skin color and pigmentation, normal skin turgor, and no rash.   Neurologic: Non-focal. Grossly intact.   Psychiatric: Alert and oriented x 3. Affect normal to patient baseline. Mood: Appropriate.  Physical Exam     Assessment/Plan   Healthy female exam.  This is a 64-year-old female with a normal exam.  No Pap smear was sent, she is high risk HPV negative in 2023.    Her breast imaging is current with the oncology team.    She is followed for osteoporosis with Rheumatology.    The exam is suspicious for lichen sclerosis of the lower vulva and perineal area.  I recommend clobetasol ointment.  She will start by using a small amount once to twice daily for a week, then maintenance will be sparingly twice weekly thereafter.    She will call me with any concerns.    Follow-up is currently in 1 year.  Education reviewed: self breast exams.

## 2025-07-07 ENCOUNTER — APPOINTMENT (OUTPATIENT)
Dept: RADIOLOGY | Facility: HOSPITAL | Age: 65
End: 2025-07-07
Payer: COMMERCIAL

## 2025-07-08 ENCOUNTER — APPOINTMENT (OUTPATIENT)
Dept: INFUSION THERAPY | Facility: CLINIC | Age: 65
End: 2025-07-08
Payer: COMMERCIAL

## 2025-07-08 VITALS
OXYGEN SATURATION: 97 % | DIASTOLIC BLOOD PRESSURE: 73 MMHG | TEMPERATURE: 97.2 F | HEART RATE: 66 BPM | RESPIRATION RATE: 16 BRPM | SYSTOLIC BLOOD PRESSURE: 109 MMHG

## 2025-07-08 DIAGNOSIS — M81.0 OSTEOPOROSIS WITHOUT CURRENT PATHOLOGICAL FRACTURE, UNSPECIFIED OSTEOPOROSIS TYPE: ICD-10-CM

## 2025-07-08 PROCEDURE — 96372 THER/PROPH/DIAG INJ SC/IM: CPT | Performed by: NURSE PRACTITIONER

## 2025-07-08 RX ORDER — EPINEPHRINE 0.3 MG/.3ML
0.3 INJECTION SUBCUTANEOUS EVERY 5 MIN PRN
OUTPATIENT
Start: 2025-08-05

## 2025-07-08 RX ORDER — DIPHENHYDRAMINE HYDROCHLORIDE 50 MG/ML
50 INJECTION, SOLUTION INTRAMUSCULAR; INTRAVENOUS AS NEEDED
OUTPATIENT
Start: 2025-08-05

## 2025-07-08 RX ORDER — FAMOTIDINE 10 MG/ML
20 INJECTION, SOLUTION INTRAVENOUS ONCE AS NEEDED
OUTPATIENT
Start: 2025-08-05

## 2025-07-08 RX ORDER — ALBUTEROL SULFATE 0.83 MG/ML
3 SOLUTION RESPIRATORY (INHALATION) AS NEEDED
OUTPATIENT
Start: 2025-08-05

## 2025-07-08 ASSESSMENT — ENCOUNTER SYMPTOMS
NUMBNESS: 0
LEG SWELLING: 0
WOUND: 0
COUGH: 0
WHEEZING: 0
EXTREMITY WEAKNESS: 0
LIGHT-HEADEDNESS: 0
PALPITATIONS: 0
DIZZINESS: 0
SHORTNESS OF BREATH: 0

## 2025-07-08 NOTE — PROGRESS NOTES
[facility]   Infusion Clinic Note   Date: 2025   Name: Lashay Isaac  : 1960   MRN: [id number]         Reason for Visit: Injections and Injection Follow-up (Evenity 210m(total) subcutaneous injections/#2 of 12 doses)         Today: We administered romosozumab.       Ordered By: Tatiana Sagastume MD       For a Diagnosis of: Osteoporosis without current pathological fracture, unspecified osteoporosis type       At today's visit patient accompanied by: Self      Today's Vitals:   Vitals:    25 0754   BP: 109/73   Pulse: 66   Resp: 16   Temp: 36.2 °C (97.2 °F)   TempSrc: Temporal   SpO2: 97%             Pre - Treatment Checklist:      - Previous reaction to current treatment: no      (Assess patient for the concerns below. Document provider notification as appropriate).  - Active or recent infection with/without current antibiotic use: no  - Recent or planned invasive dental work: no  - Recent or planned surgeries: no  - Recently received or plans to receive vaccinations: no  - Has treatment related toxicities: no  - Any chance may be pregnant:  no      Pain: 0   - Is the pain different from normal: n/a   - Is prescribing Doctor aware:  n/a      Labs: Reviewed       Fall Risk Screening: Pedraza Fall Risk  History of Falling, Immediate or Within 3 Months: No  Secondary Diagnosis: Yes  Ambulatory Aid: Walks without aid/bedrest/nurse assist  Intravenous Therapy/Heparin Lock: No  Gait/Transferring: Normal/bedrest/immobile  Mental Status: Oriented to own ability  Pedraza Fall Risk Score: 15       Review Of Systems:  Review of Systems   Respiratory:  Negative for cough, shortness of breath and wheezing.    Cardiovascular:  Negative for chest pain, leg swelling and palpitations.   Skin:  Negative for itching, rash and wound.   Neurological:  Negative for dizziness, extremity weakness, light-headedness and numbness.         Infusion Readiness:  - Assessment Concerns Related to  Infusion: No  - Provider notified: n/a      New Patient Education:    N/A (returning patient for continuation of therapy. Ongoing education provided as needed.)        Treatment Conditions & Drug Specific Questions:    (Unless otherwise specified on patient specific therapy plan):     TREATMENT CONDITIONS:  Unless otherwise specified on patient specific therapy plan HOLD and notify provider prior to proceeding with treatment if:   o Corrected or Serum Calcium LESS THAN 8.6 mg/dL  OR Ionized calcium less than 1.1 mmol/L or less than 4.7 mg/dL (depending on resulting agency)  - Invasive dental work within or scheduled in the next 4 weeks    Lab Results   Component Value Date    CALCIUM 9.3 05/13/2025      Patient meets treatment conditions? Yes    DRUG SPECIFIC QUESTIONS:  Are you taking Calcium and Vitamin D supplements?  Yes  (Recommended)    Are you aware of the risk of heart attack, stroke, osteonecrosis of the jaw and/or atypical femur fractures associated with this drug?  Yes    Any history of heart attack or stroke in the last year?  No  (Romosozumab (evenity) should not be initiated in patients who have had a myocardial infarction or stroke within the preceding year)    Education regarding signs and symptoms of heart attack and stroke provided? Yes  (Signs and symptoms may include chest pain or pressure, shortness of breath, feeling lightheaded or dizzy, headache, numbness or weakness in face, arms or legs, difficulty talking, change in vision or loss of balance)      REMINDER:   PREGNANCY CATEGORY X DRUG. OBTAIN NEGATIVE PREGNANCY TEST PRIOR TO FIRST INJECTION FOR WOMEN OF CHILDBEARING ABILITY     Recommended Vitals/Observation:  Vitals: Obtain vital signs prior to injection and as needed.   Observation: Patient may leave immediately following injection.        Weight Based Drug Calculations:    WEIGHT BASED DRUGS: NOT APPLICABLE / FLAT DOSE       Post Treatment: Patient tolerated treatment without issue and  was discharged in no apparent distress.      Note Authored / Patient Cared for By: Maria Luz Barclay LPN

## 2025-07-08 NOTE — PATIENT INSTRUCTIONS
Today :We administered romosozumab. Evenity 210mg(total) subcutaneous injections  105mg right upper arm-anterior  105mg right upper arm-superior  #2 of 12 doses    For:   1. Osteoporosis without current pathological fracture, unspecified osteoporosis type       Your next appointment is due in:  4 weeks        Please read the  Medication Guide that was given to you and reviewed during todays visit.     (Tell all doctors including dentists that you are taking this medication)     Go to the emergency room or call 911 if:  -You have signs of allergic reaction:   -Rash, hives, itching.   -Swollen, blistered, peeling skin.   -Swelling of face, lips, mouth, tongue or throat.   -Tightness of chest, trouble breathing, swallowing or talking     Call your doctor:  - If injection sites gets red, warm, swollen, itchy or leaks fluid or pus.     (Leave band aids on your injections sites for at least 2 hours and keep area clean and dry)  - If you get sick or have symptoms of infection or are not feeling well for any reason.    (Wash your hands often, stay away from people who are sick)  - If you have side effects from your medication that do not go away or are bothersome.     (Refer to the teaching your nurse gave you for side effects to call your doctor about)    - Common side effects may include:  stuffy nose, headache, feeling tired, muscle aches, upset stomach  - Before receiving any vaccines     - [facility] at   If:  - You get sick, are on antibiotics, have had a recent vaccine, have surgery or dental work and your doctor wants your visit rescheduled.  - You need to cancel and reschedule your visit for any reason. Call at least 2 days before your visit if you need to cancel.   - Your insurance changes before your next visit.    (We will need to get approval from your new insurance. This can take up to two weeks.)     [facility] is opened Monday thru Friday. We are closed on  weekends and holidays.   Voice mail will take your call if the center is closed. If you leave a message please allow 24 hours for a call back during weekdays. If you leave a message on a weekend/holiday, we will call you back the next business day.    A pharmacist is available Monday - Friday from 8:30AM to 3:30PM to help answer any questions you may have about your prescriptions(s). Please call pharmacy at:    [facility]: [phone]  [facility]: [phone]  [facility]: [phone]

## 2025-07-09 ENCOUNTER — HOSPITAL ENCOUNTER (OUTPATIENT)
Dept: RADIOLOGY | Facility: CLINIC | Age: 65
Discharge: HOME | End: 2025-07-09
Payer: COMMERCIAL

## 2025-07-09 VITALS — HEIGHT: 63 IN | BODY MASS INDEX: 27.46 KG/M2 | WEIGHT: 154.98 LBS

## 2025-07-09 DIAGNOSIS — Z08 ENCOUNTER FOR FOLLOW-UP SURVEILLANCE OF BREAST CANCER: ICD-10-CM

## 2025-07-09 DIAGNOSIS — Z85.3 ENCOUNTER FOR FOLLOW-UP SURVEILLANCE OF BREAST CANCER: ICD-10-CM

## 2025-07-09 DIAGNOSIS — C50.912 MALIGNANT NEOPLASM OF LEFT BREAST IN FEMALE, ESTROGEN RECEPTOR POSITIVE, UNSPECIFIED SITE OF BREAST: ICD-10-CM

## 2025-07-09 DIAGNOSIS — Z17.0 MALIGNANT NEOPLASM OF LEFT BREAST IN FEMALE, ESTROGEN RECEPTOR POSITIVE, UNSPECIFIED SITE OF BREAST: ICD-10-CM

## 2025-07-09 PROCEDURE — 77067 SCR MAMMO BI INCL CAD: CPT | Mod: 52,RT

## 2025-07-09 PROCEDURE — 77067 SCR MAMMO BI INCL CAD: CPT | Mod: RIGHT SIDE | Performed by: RADIOLOGY

## 2025-07-09 PROCEDURE — 77063 BREAST TOMOSYNTHESIS BI: CPT | Mod: RIGHT SIDE | Performed by: RADIOLOGY

## 2025-07-10 ENCOUNTER — TELEPHONE (OUTPATIENT)
Dept: HEMATOLOGY/ONCOLOGY | Facility: HOSPITAL | Age: 65
End: 2025-07-10
Payer: COMMERCIAL

## 2025-07-10 NOTE — TELEPHONE ENCOUNTER
VM left for patient regarding normal mammogram and next Office visit. I  have instructed her to call me in the dept with questions

## 2025-08-05 ENCOUNTER — APPOINTMENT (OUTPATIENT)
Dept: INFUSION THERAPY | Facility: CLINIC | Age: 65
End: 2025-08-05
Payer: COMMERCIAL

## 2025-08-05 VITALS
TEMPERATURE: 97.9 F | SYSTOLIC BLOOD PRESSURE: 125 MMHG | RESPIRATION RATE: 16 BRPM | HEART RATE: 66 BPM | DIASTOLIC BLOOD PRESSURE: 79 MMHG | OXYGEN SATURATION: 98 %

## 2025-08-05 DIAGNOSIS — M81.0 OSTEOPOROSIS WITHOUT CURRENT PATHOLOGICAL FRACTURE, UNSPECIFIED OSTEOPOROSIS TYPE: ICD-10-CM

## 2025-08-05 PROCEDURE — 96372 THER/PROPH/DIAG INJ SC/IM: CPT | Performed by: NURSE PRACTITIONER

## 2025-08-05 RX ORDER — FAMOTIDINE 10 MG/ML
20 INJECTION, SOLUTION INTRAVENOUS ONCE AS NEEDED
OUTPATIENT
Start: 2025-09-02

## 2025-08-05 RX ORDER — EPINEPHRINE 0.3 MG/.3ML
0.3 INJECTION SUBCUTANEOUS EVERY 5 MIN PRN
OUTPATIENT
Start: 2025-09-02

## 2025-08-05 RX ORDER — ALBUTEROL SULFATE 0.83 MG/ML
3 SOLUTION RESPIRATORY (INHALATION) AS NEEDED
OUTPATIENT
Start: 2025-09-02

## 2025-08-05 RX ORDER — DIPHENHYDRAMINE HYDROCHLORIDE 50 MG/ML
50 INJECTION, SOLUTION INTRAMUSCULAR; INTRAVENOUS AS NEEDED
OUTPATIENT
Start: 2025-09-02

## 2025-08-05 ASSESSMENT — ENCOUNTER SYMPTOMS
WOUND: 0
DIZZINESS: 0
NUMBNESS: 0
SHORTNESS OF BREATH: 0
PALPITATIONS: 0
EXTREMITY WEAKNESS: 0
LEG SWELLING: 0
LIGHT-HEADEDNESS: 0
COUGH: 0
WHEEZING: 0

## 2025-08-05 NOTE — PROGRESS NOTES
[facility]   Infusion Clinic Note   Date: 2025   Name: Lashay Isaac  : 1960   MRN: [id number]         Reason for Visit: Injections and Injection Follow-up (Evenity 210mg(total) subcutaneous injections/#3 of 12 doses)         Today: We administered romosozumab.       Referring Provider: Tatiana Sagastume MD    Plan Provider: Tatiana Sagastume MD      For a Diagnosis of: Osteoporosis without current pathological fracture, unspecified osteoporosis type       At today's visit patient accompanied by: Self      Today's Vitals:   Vitals:    25 0828   BP: 125/79   Pulse: 66   Resp: 16   Temp: 36.6 °C (97.9 °F)   TempSrc: Temporal   SpO2: 98%             Pre - Treatment Checklist:      - Previous reaction to current treatment: no      (Assess patient for the concerns below. Document provider notification as appropriate).  - Active or recent infection with/without current antibiotic use: no  - Recent or planned invasive dental work: no  - Recent or planned surgeries: no  - Recently received or plans to receive vaccinations: no  - Has treatment related toxicities: no  - Any chance may be pregnant:  no      Pain: 0   - Is the pain different from normal: n/a   - Is prescribing Doctor aware:  n/a      Labs: Reviewed       Fall Risk Screening: Pedraza Fall Risk  History of Falling, Immediate or Within 3 Months: No  Secondary Diagnosis: Yes  Ambulatory Aid: Walks without aid/bedrest/nurse assist  Intravenous Therapy/Heparin Lock: No  Gait/Transferring: Normal/bedrest/immobile  Mental Status: Oriented to own ability  Pedraza Fall Risk Score: 15            Review Of Systems:  Review of Systems   Respiratory:  Negative for cough, shortness of breath and wheezing.    Cardiovascular:  Negative for chest pain, leg swelling and palpitations.   Skin:  Negative for itching, rash and wound.   Neurological:  Negative for dizziness, extremity weakness, light-headedness and numbness.         Infusion  Readiness:  - Assessment Concerns Related to Infusion: No  - Provider notified: n/a      New Patient Education:    N/A (returning patient for continuation of therapy. Ongoing education provided as needed.)        Treatment Conditions & Drug Specific Questions:    Romosozumab  (EVENITY)    (Unless otherwise specified on patient specific therapy plan):     TREATMENT CONDITIONS:  Unless otherwise specified on patient specific therapy plan HOLD and notify provider prior to proceeding with treatment if:   o Corrected or Serum Calcium LESS THAN 8.6 mg/dL  OR Ionized calcium less than 1.1 mmol/L or less than 4.7 mg/dL (depending on resulting agency)  - Invasive dental work within or scheduled in the next 4 weeks    Lab Results   Component Value Date    CALCIUM 9.3 05/13/2025      Patient meets treatment conditions? Yes    DRUG SPECIFIC QUESTIONS:  Are you taking Calcium and Vitamin D supplements?  Yes  (Recommended)    Are you aware of the risk of heart attack, stroke, osteonecrosis of the jaw and/or atypical femur fractures associated with this drug?  Yes    Any history of heart attack or stroke in the last year?  No  (Romosozumab (evenity) should not be initiated in patients who have had a myocardial infarction or stroke within the preceding year)    Education regarding signs and symptoms of heart attack and stroke provided? Yes  (Signs and symptoms may include chest pain or pressure, shortness of breath, feeling lightheaded or dizzy, headache, numbness or weakness in face, arms or legs, difficulty talking, change in vision or loss of balance)      REMINDER:   PREGNANCY CATEGORY X DRUG. OBTAIN NEGATIVE PREGNANCY TEST PRIOR TO FIRST INJECTION FOR WOMEN OF CHILDBEARING ABILITY     Recommended Vitals/Observation:  Vitals: Obtain vital signs prior to injection and as needed.   Observation: Patient may leave immediately following injection.        Weight Based Drug Calculations:    WEIGHT BASED DRUGS: NOT APPLICABLE / FLAT  DOSE       Post Treatment: Patient tolerated treatment without issue and was discharged in no apparent distress.      Note Authored / Patient Cared for By: Maria Luz Barclay LPN

## 2025-08-05 NOTE — PATIENT INSTRUCTIONS
Today :We administered romosozumab. Evenity 210mg(total) subcutaneous injections  105mg right upper arm-anterior  105mg right upper arm-superior  #3 of 12 doses    For:   1. Osteoporosis without current pathological fracture, unspecified osteoporosis type       Your next appointment is due in:  4 weeks        Please read the  Medication Guide that was given to you and reviewed during todays visit.     (Tell all doctors including dentists that you are taking this medication)     Go to the emergency room or call 911 if:  -You have signs of allergic reaction:   -Rash, hives, itching.   -Swollen, blistered, peeling skin.   -Swelling of face, lips, mouth, tongue or throat.   -Tightness of chest, trouble breathing, swallowing or talking     Call your doctor:  - If injection sites gets red, warm, swollen, itchy or leaks fluid or pus.     (Leave band aids on your injection sites for at least 2 hours and keep area clean and dry)  - If you get sick or have symptoms of infection or are not feeling well for any reason.    (Wash your hands often, stay away from people who are sick)  - If you have side effects from your medication that do not go away or are bothersome.     (Refer to the teaching your nurse gave you for side effects to call your doctor about)    - Common side effects may include:  stuffy nose, headache, feeling tired, muscle aches, upset stomach  - Before receiving any vaccines     - [facility] at   If:  - You get sick, are on antibiotics, have had a recent vaccine, have surgery or dental work and your doctor wants your visit rescheduled.  - You need to cancel and reschedule your visit for any reason. Call at least 2 days before your visit if you need to cancel.   - Your insurance changes before your next visit.    (We will need to get approval from your new insurance. This can take up to two weeks.)     [facility] is opened Monday thru Friday. We are closed on  weekends and holidays.   Voice mail will take your call if the center is closed. If you leave a message please allow 24 hours for a call back during weekdays. If you leave a message on a weekend/holiday, we will call you back the next business day.    A pharmacist is available Monday - Friday from 8:30AM to 3:30PM to help answer any questions you may have about your prescriptions(s). Please call pharmacy at:    [facility]: [phone]  [facility]: [phone]  [facility]: [phone]

## 2025-08-27 ENCOUNTER — HOSPITAL ENCOUNTER (OUTPATIENT)
Dept: RADIOLOGY | Facility: HOSPITAL | Age: 65
Discharge: HOME | End: 2025-08-27
Payer: COMMERCIAL

## 2025-08-27 DIAGNOSIS — Z87.891 FORMER SMOKER: ICD-10-CM

## 2025-08-27 DIAGNOSIS — E78.5 DYSLIPIDEMIA: ICD-10-CM

## 2025-08-27 PROCEDURE — 75571 CT HRT W/O DYE W/CA TEST: CPT

## 2025-09-02 ENCOUNTER — APPOINTMENT (OUTPATIENT)
Dept: INFUSION THERAPY | Facility: CLINIC | Age: 65
End: 2025-09-02
Payer: COMMERCIAL

## 2025-09-02 ASSESSMENT — ENCOUNTER SYMPTOMS
WOUND: 0
LEG SWELLING: 0
SHORTNESS OF BREATH: 0
LIGHT-HEADEDNESS: 0
WHEEZING: 0
COUGH: 0
PALPITATIONS: 0
EXTREMITY WEAKNESS: 0
DIZZINESS: 0
NUMBNESS: 0

## 2025-10-01 ENCOUNTER — APPOINTMENT (OUTPATIENT)
Dept: INFUSION THERAPY | Facility: CLINIC | Age: 65
End: 2025-10-01
Payer: COMMERCIAL

## 2026-06-03 ENCOUNTER — APPOINTMENT (OUTPATIENT)
Dept: PRIMARY CARE | Facility: CLINIC | Age: 66
End: 2026-06-03
Payer: COMMERCIAL

## 2026-07-06 ENCOUNTER — APPOINTMENT (OUTPATIENT)
Facility: CLINIC | Age: 66
End: 2026-07-06
Payer: COMMERCIAL